# Patient Record
Sex: MALE | Race: WHITE | NOT HISPANIC OR LATINO | Employment: OTHER | URBAN - METROPOLITAN AREA
[De-identification: names, ages, dates, MRNs, and addresses within clinical notes are randomized per-mention and may not be internally consistent; named-entity substitution may affect disease eponyms.]

---

## 2019-05-15 ENCOUNTER — HOSPITAL ENCOUNTER (OUTPATIENT)
Facility: CLINIC | Age: 59
Setting detail: OBSERVATION
Discharge: HOME | End: 2019-05-16
Attending: EMERGENCY MEDICINE | Admitting: HOSPITALIST
Payer: COMMERCIAL

## 2019-05-15 ENCOUNTER — APPOINTMENT (OUTPATIENT)
Dept: CT IMAGING | Facility: CLINIC | Age: 59
End: 2019-05-15
Attending: EMERGENCY MEDICINE
Payer: COMMERCIAL

## 2019-05-15 DIAGNOSIS — N23 URETERAL COLIC: Primary | ICD-10-CM

## 2019-05-15 DIAGNOSIS — R11.2 INTRACTABLE VOMITING WITH NAUSEA, UNSPECIFIED VOMITING TYPE: ICD-10-CM

## 2019-05-15 DIAGNOSIS — N23 RENAL COLIC: ICD-10-CM

## 2019-05-15 LAB
ALBUMIN UR-MCNC: NEGATIVE MG/DL
ANION GAP SERPL CALCULATED.3IONS-SCNC: 10 MMOL/L (ref 3–14)
APPEARANCE UR: CLEAR
BASOPHILS # BLD AUTO: 0 10E9/L (ref 0–0.2)
BASOPHILS NFR BLD AUTO: 0.4 %
BILIRUB UR QL STRIP: NEGATIVE
BUN SERPL-MCNC: 15 MG/DL (ref 7–30)
CALCIUM SERPL-MCNC: 9.4 MG/DL (ref 8.5–10.1)
CHLORIDE SERPL-SCNC: 106 MMOL/L (ref 94–109)
CO2 SERPL-SCNC: 21 MMOL/L (ref 20–32)
COLOR UR AUTO: ABNORMAL
CREAT SERPL-MCNC: 1.16 MG/DL (ref 0.66–1.25)
DIFFERENTIAL METHOD BLD: NORMAL
EOSINOPHIL # BLD AUTO: 0.1 10E9/L (ref 0–0.7)
EOSINOPHIL NFR BLD AUTO: 1.1 %
ERYTHROCYTE [DISTWIDTH] IN BLOOD BY AUTOMATED COUNT: 11.5 % (ref 10–15)
GFR SERPL CREATININE-BSD FRML MDRD: 69 ML/MIN/{1.73_M2}
GLUCOSE SERPL-MCNC: 121 MG/DL (ref 70–99)
GLUCOSE UR STRIP-MCNC: NEGATIVE MG/DL
HCT VFR BLD AUTO: 47.4 % (ref 40–53)
HGB BLD-MCNC: 16.5 G/DL (ref 13.3–17.7)
HGB UR QL STRIP: ABNORMAL
IMM GRANULOCYTES # BLD: 0 10E9/L (ref 0–0.4)
IMM GRANULOCYTES NFR BLD: 0.4 %
KETONES UR STRIP-MCNC: ABNORMAL MG/DL
LEUKOCYTE ESTERASE UR QL STRIP: NEGATIVE
LYMPHOCYTES # BLD AUTO: 1.7 10E9/L (ref 0.8–5.3)
LYMPHOCYTES NFR BLD AUTO: 20.6 %
MCH RBC QN AUTO: 31.2 PG (ref 26.5–33)
MCHC RBC AUTO-ENTMCNC: 34.8 G/DL (ref 31.5–36.5)
MCV RBC AUTO: 90 FL (ref 78–100)
MONOCYTES # BLD AUTO: 0.8 10E9/L (ref 0–1.3)
MONOCYTES NFR BLD AUTO: 9.7 %
MUCOUS THREADS #/AREA URNS LPF: PRESENT /LPF
NEUTROPHILS # BLD AUTO: 5.8 10E9/L (ref 1.6–8.3)
NEUTROPHILS NFR BLD AUTO: 67.8 %
NITRATE UR QL: NEGATIVE
NRBC # BLD AUTO: 0 10*3/UL
NRBC BLD AUTO-RTO: 0 /100
PH UR STRIP: 6.5 PH (ref 5–7)
PLATELET # BLD AUTO: 205 10E9/L (ref 150–450)
POTASSIUM SERPL-SCNC: 3.9 MMOL/L (ref 3.4–5.3)
RBC # BLD AUTO: 5.29 10E12/L (ref 4.4–5.9)
RBC #/AREA URNS AUTO: 1 /HPF (ref 0–2)
SODIUM SERPL-SCNC: 137 MMOL/L (ref 133–144)
SOURCE: ABNORMAL
SP GR UR STRIP: 1.02 (ref 1–1.03)
SQUAMOUS #/AREA URNS AUTO: <1 /HPF (ref 0–1)
UROBILINOGEN UR STRIP-MCNC: NORMAL MG/DL (ref 0–2)
WBC # BLD AUTO: 8.5 10E9/L (ref 4–11)
WBC #/AREA URNS AUTO: 1 /HPF (ref 0–5)

## 2019-05-15 PROCEDURE — 96361 HYDRATE IV INFUSION ADD-ON: CPT

## 2019-05-15 PROCEDURE — 99219 ZZC INITIAL OBSERVATION CARE,LEVL II: CPT | Performed by: INTERNAL MEDICINE

## 2019-05-15 PROCEDURE — 96374 THER/PROPH/DIAG INJ IV PUSH: CPT

## 2019-05-15 PROCEDURE — 85025 COMPLETE CBC W/AUTO DIFF WBC: CPT | Performed by: EMERGENCY MEDICINE

## 2019-05-15 PROCEDURE — 25000132 ZZH RX MED GY IP 250 OP 250 PS 637: Performed by: PHYSICIAN ASSISTANT

## 2019-05-15 PROCEDURE — 99285 EMERGENCY DEPT VISIT HI MDM: CPT | Mod: 25

## 2019-05-15 PROCEDURE — 25000128 H RX IP 250 OP 636: Performed by: EMERGENCY MEDICINE

## 2019-05-15 PROCEDURE — G0378 HOSPITAL OBSERVATION PER HR: HCPCS

## 2019-05-15 PROCEDURE — 81001 URINALYSIS AUTO W/SCOPE: CPT | Performed by: INTERNAL MEDICINE

## 2019-05-15 PROCEDURE — 96376 TX/PRO/DX INJ SAME DRUG ADON: CPT

## 2019-05-15 PROCEDURE — 80048 BASIC METABOLIC PNL TOTAL CA: CPT | Performed by: EMERGENCY MEDICINE

## 2019-05-15 PROCEDURE — 25000128 H RX IP 250 OP 636: Performed by: PHYSICIAN ASSISTANT

## 2019-05-15 PROCEDURE — 99244 OFF/OP CNSLTJ NEW/EST MOD 40: CPT | Mod: 57 | Performed by: UROLOGY

## 2019-05-15 PROCEDURE — 74176 CT ABD & PELVIS W/O CONTRAST: CPT

## 2019-05-15 PROCEDURE — 25800030 ZZH RX IP 258 OP 636: Performed by: PHYSICIAN ASSISTANT

## 2019-05-15 PROCEDURE — 25800030 ZZH RX IP 258 OP 636: Performed by: EMERGENCY MEDICINE

## 2019-05-15 PROCEDURE — 96375 TX/PRO/DX INJ NEW DRUG ADDON: CPT

## 2019-05-15 RX ORDER — HYDROMORPHONE HYDROCHLORIDE 1 MG/ML
.3-.5 INJECTION, SOLUTION INTRAMUSCULAR; INTRAVENOUS; SUBCUTANEOUS
Status: DISCONTINUED | OUTPATIENT
Start: 2019-05-15 | End: 2019-05-16 | Stop reason: HOSPADM

## 2019-05-15 RX ORDER — MORPHINE SULFATE 4 MG/ML
4 INJECTION, SOLUTION INTRAMUSCULAR; INTRAVENOUS
Status: DISCONTINUED | OUTPATIENT
Start: 2019-05-15 | End: 2019-05-15

## 2019-05-15 RX ORDER — LORAZEPAM 2 MG/ML
1 INJECTION INTRAMUSCULAR ONCE
Status: COMPLETED | OUTPATIENT
Start: 2019-05-15 | End: 2019-05-15

## 2019-05-15 RX ORDER — ONDANSETRON 2 MG/ML
4 INJECTION INTRAMUSCULAR; INTRAVENOUS EVERY 6 HOURS PRN
Status: DISCONTINUED | OUTPATIENT
Start: 2019-05-15 | End: 2019-05-16 | Stop reason: HOSPADM

## 2019-05-15 RX ORDER — ACETAMINOPHEN 325 MG/1
650 TABLET ORAL EVERY 4 HOURS PRN
Status: DISCONTINUED | OUTPATIENT
Start: 2019-05-15 | End: 2019-05-16 | Stop reason: HOSPADM

## 2019-05-15 RX ORDER — OXYCODONE HYDROCHLORIDE 5 MG/1
5-10 TABLET ORAL
Status: DISCONTINUED | OUTPATIENT
Start: 2019-05-15 | End: 2019-05-16 | Stop reason: HOSPADM

## 2019-05-15 RX ORDER — PROCHLORPERAZINE 25 MG
25 SUPPOSITORY, RECTAL RECTAL EVERY 12 HOURS PRN
Status: DISCONTINUED | OUTPATIENT
Start: 2019-05-15 | End: 2019-05-16 | Stop reason: HOSPADM

## 2019-05-15 RX ORDER — CITALOPRAM HYDROBROMIDE 20 MG/1
20 TABLET ORAL DAILY
Status: DISCONTINUED | OUTPATIENT
Start: 2019-05-15 | End: 2019-05-16 | Stop reason: HOSPADM

## 2019-05-15 RX ORDER — TAMSULOSIN HYDROCHLORIDE 0.4 MG/1
0.4 CAPSULE ORAL DAILY
Status: DISCONTINUED | OUTPATIENT
Start: 2019-05-15 | End: 2019-05-16 | Stop reason: HOSPADM

## 2019-05-15 RX ORDER — NALOXONE HYDROCHLORIDE 0.4 MG/ML
.1-.4 INJECTION, SOLUTION INTRAMUSCULAR; INTRAVENOUS; SUBCUTANEOUS
Status: DISCONTINUED | OUTPATIENT
Start: 2019-05-15 | End: 2019-05-16 | Stop reason: HOSPADM

## 2019-05-15 RX ORDER — ACETAMINOPHEN 325 MG/1
325-650 TABLET ORAL EVERY 6 HOURS PRN
COMMUNITY
End: 2021-08-26

## 2019-05-15 RX ORDER — CITALOPRAM HYDROBROMIDE 20 MG/1
20 TABLET ORAL DAILY
COMMUNITY
Start: 2019-02-02 | End: 2019-08-27

## 2019-05-15 RX ORDER — LORAZEPAM 2 MG/ML
0.5 INJECTION INTRAMUSCULAR EVERY 6 HOURS PRN
Status: DISCONTINUED | OUTPATIENT
Start: 2019-05-15 | End: 2019-05-16 | Stop reason: HOSPADM

## 2019-05-15 RX ORDER — SODIUM CHLORIDE, SODIUM LACTATE, POTASSIUM CHLORIDE, CALCIUM CHLORIDE 600; 310; 30; 20 MG/100ML; MG/100ML; MG/100ML; MG/100ML
INJECTION, SOLUTION INTRAVENOUS CONTINUOUS
Status: ACTIVE | OUTPATIENT
Start: 2019-05-15 | End: 2019-05-16

## 2019-05-15 RX ORDER — METOCLOPRAMIDE HYDROCHLORIDE 5 MG/ML
10 INJECTION INTRAMUSCULAR; INTRAVENOUS ONCE
Status: COMPLETED | OUTPATIENT
Start: 2019-05-15 | End: 2019-05-15

## 2019-05-15 RX ORDER — ONDANSETRON 4 MG/1
4 TABLET, ORALLY DISINTEGRATING ORAL EVERY 6 HOURS PRN
Status: DISCONTINUED | OUTPATIENT
Start: 2019-05-15 | End: 2019-05-16 | Stop reason: HOSPADM

## 2019-05-15 RX ORDER — HYDROMORPHONE HYDROCHLORIDE 1 MG/ML
.3-.5 INJECTION, SOLUTION INTRAMUSCULAR; INTRAVENOUS; SUBCUTANEOUS
Status: DISCONTINUED | OUTPATIENT
Start: 2019-05-15 | End: 2019-05-15

## 2019-05-15 RX ORDER — POLYETHYLENE GLYCOL 3350 17 G/17G
17 POWDER, FOR SOLUTION ORAL DAILY PRN
Status: DISCONTINUED | OUTPATIENT
Start: 2019-05-15 | End: 2019-05-16 | Stop reason: HOSPADM

## 2019-05-15 RX ORDER — LORAZEPAM 2 MG/ML
.5-1 INJECTION INTRAMUSCULAR EVERY 4 HOURS PRN
Status: DISCONTINUED | OUTPATIENT
Start: 2019-05-15 | End: 2019-05-15

## 2019-05-15 RX ORDER — AMOXICILLIN 250 MG
2 CAPSULE ORAL 2 TIMES DAILY PRN
Status: DISCONTINUED | OUTPATIENT
Start: 2019-05-15 | End: 2019-05-16 | Stop reason: HOSPADM

## 2019-05-15 RX ORDER — CLONAZEPAM 1 MG/1
1 TABLET ORAL 2 TIMES DAILY PRN
COMMUNITY
Start: 2017-04-14 | End: 2019-05-15

## 2019-05-15 RX ORDER — ACETAMINOPHEN 650 MG/1
650 SUPPOSITORY RECTAL EVERY 4 HOURS PRN
Status: DISCONTINUED | OUTPATIENT
Start: 2019-05-15 | End: 2019-05-16 | Stop reason: HOSPADM

## 2019-05-15 RX ORDER — SODIUM CHLORIDE 9 MG/ML
1000 INJECTION, SOLUTION INTRAVENOUS CONTINUOUS
Status: DISCONTINUED | OUTPATIENT
Start: 2019-05-15 | End: 2019-05-15

## 2019-05-15 RX ORDER — AMOXICILLIN 250 MG
1 CAPSULE ORAL 2 TIMES DAILY PRN
Status: DISCONTINUED | OUTPATIENT
Start: 2019-05-15 | End: 2019-05-16 | Stop reason: HOSPADM

## 2019-05-15 RX ORDER — PROCHLORPERAZINE MALEATE 5 MG
10 TABLET ORAL EVERY 6 HOURS PRN
Status: DISCONTINUED | OUTPATIENT
Start: 2019-05-15 | End: 2019-05-16 | Stop reason: HOSPADM

## 2019-05-15 RX ORDER — ONDANSETRON 2 MG/ML
4 INJECTION INTRAMUSCULAR; INTRAVENOUS EVERY 30 MIN PRN
Status: DISCONTINUED | OUTPATIENT
Start: 2019-05-15 | End: 2019-05-15

## 2019-05-15 RX ADMIN — SODIUM CHLORIDE, POTASSIUM CHLORIDE, SODIUM LACTATE AND CALCIUM CHLORIDE: 600; 310; 30; 20 INJECTION, SOLUTION INTRAVENOUS at 18:30

## 2019-05-15 RX ADMIN — HYDROMORPHONE HYDROCHLORIDE 1 MG: 1 INJECTION, SOLUTION INTRAMUSCULAR; INTRAVENOUS; SUBCUTANEOUS at 06:28

## 2019-05-15 RX ADMIN — Medication 0.5 MG: at 14:00

## 2019-05-15 RX ADMIN — SODIUM CHLORIDE, POTASSIUM CHLORIDE, SODIUM LACTATE AND CALCIUM CHLORIDE: 600; 310; 30; 20 INJECTION, SOLUTION INTRAVENOUS at 11:44

## 2019-05-15 RX ADMIN — LORAZEPAM 1 MG: 2 INJECTION INTRAMUSCULAR; INTRAVENOUS at 09:31

## 2019-05-15 RX ADMIN — TAMSULOSIN HYDROCHLORIDE 0.4 MG: 0.4 CAPSULE ORAL at 12:57

## 2019-05-15 RX ADMIN — Medication 0.5 MG: at 22:20

## 2019-05-15 RX ADMIN — ONDANSETRON 4 MG: 2 INJECTION INTRAMUSCULAR; INTRAVENOUS at 06:29

## 2019-05-15 RX ADMIN — SODIUM CHLORIDE 1000 ML: 9 INJECTION, SOLUTION INTRAVENOUS at 08:48

## 2019-05-15 RX ADMIN — CITALOPRAM HYDROBROMIDE 20 MG: 20 TABLET ORAL at 12:57

## 2019-05-15 RX ADMIN — SODIUM CHLORIDE 1000 ML: 9 INJECTION, SOLUTION INTRAVENOUS at 06:28

## 2019-05-15 RX ADMIN — Medication 0.5 MG: at 17:08

## 2019-05-15 RX ADMIN — HYDROMORPHONE HYDROCHLORIDE 1 MG: 1 INJECTION, SOLUTION INTRAMUSCULAR; INTRAVENOUS; SUBCUTANEOUS at 06:59

## 2019-05-15 RX ADMIN — METOCLOPRAMIDE 10 MG: 5 INJECTION, SOLUTION INTRAMUSCULAR; INTRAVENOUS at 08:48

## 2019-05-15 RX ADMIN — LORAZEPAM 0.5 MG: 2 INJECTION INTRAMUSCULAR; INTRAVENOUS at 18:10

## 2019-05-15 RX ADMIN — ONDANSETRON 4 MG: 2 INJECTION INTRAMUSCULAR; INTRAVENOUS at 08:08

## 2019-05-15 ASSESSMENT — ENCOUNTER SYMPTOMS
CHILLS: 1
NAUSEA: 1
DIAPHORESIS: 1
SHORTNESS OF BREATH: 1
FLANK PAIN: 1
FEVER: 0
VOMITING: 0

## 2019-05-15 NOTE — PROGRESS NOTES
UROLOGY    OK for regular diet. NPO after midnight for procedure tomorrow.     Lynda Sharma PA-C  [facility]rology  [phone]

## 2019-05-15 NOTE — H&P
[facility]  H & P      Patient Name: Krystian Akins MRN# [id number]   Age: 59 year old YOB: 1960     Date of Admission:5/15/2019    Primary care provider: Tacos Araujo  Date of Service: 5/15/2019         Assessment and Plan:   Krystian Akins is a 59 year old male with a history of Gastric Ulcer, GI Bleed, HLD, Migraine HA, BARBARA, Nephrolithiasis who presents to the ED today 2/2 left flank pain.    Acute Renal Colic - left sided flank pain with CT abd/pelvis revealing a 0.6 cm proximal left ureteral stone causing mild hydronephrosis.  Afebrile with normal wbc.  Pt with ongoing pain, nausea and emesis.  He had a past kidney stone in 2009 which was 3mm and was able to be passed spontaneously.  - start Flomax  - IVF hydration, antiemetics and analgesics prn  - monitor I/O and check bladder scan now  - strain urine  - obtain UA  - Urology consult    Generalized Anxiety Disorder - continue pta Citalopram    Hx GI Bleed - EGD 10/2013 which revealed an actively bleeding 7mm ulcer which was clipped and cauterized.  Felt due to aspirin use at that time.  Followed by [facility].  No longer on PPI.  - avoid NSAIDs for pain control    Hx HLD - not currently on medications.  Follow up with PCP.      CODE: Full  Diet/IVF: npo, NS  GI ppx:  pepcid  DVT ppx: ambulation    Daisha Fontana MS, PA-C  Physician Assistant   Hospitalist Service  Pager: [phone]           Chief Complaint:   Left Flank Pain         HPI:   59 year old male with a history of Gastric Ulcer, GI Bleed, HLD, Migraine HA, BARBARA, Nephrolithiasis who presents to the ED today 2/2 left flank pain.    Patient reports he woke up this morning around 330am with a sudden onset of left flank pain which was similar to when he had a kidney stone back in 2009.  He reports nausea and emesis since arriving to the ED.  He has not had any urine output today.  Denies dysuria or hematuria yesterday.  He denies any chest pain, shortness of breath,  cough, back injury, constipation, diarrhea, fevers.    ED work up revealed patient hemodynamically stable and afebrile on room air.  Laboratory work up revealed BMP, CBC wnl.  CT abd/pelvis revealed a 0.6 cm proximal left ureteral stone causing mild hydronephrosis and Fatty infiltration of the liver.  Patient received IVF, Ativan, Reglan, Dilaudid, Zofran and admitted for further management.         Past Medical History:     Past Medical History:   Diagnosis Date     Gastric ulcer      Generalized anxiety disorder      GI bleed      HLD (hyperlipidemia)      Migraine      Nephrolithiasis           Past Surgical History:     Past Surgical History:   Procedure Laterality Date     AS ESOPHAGOSCOPY, DIAGNOSTIC            Social History:     Social History     Socioeconomic History     Marital status:      Spouse name: Not on file     Number of children: Not on file     Years of education: Not on file     Highest education level: Not on file   Occupational History     Not on file   Social Needs     Financial resource strain: Not on file     Food insecurity:     Worry: Not on file     Inability: Not on file     Transportation needs:     Medical: Not on file     Non-medical: Not on file   Tobacco Use     Smoking status: Never Smoker   Substance and Sexual Activity     Alcohol use: Not on file     Drug use: Not on file     Sexual activity: Not on file   Lifestyle     Physical activity:     Days per week: Not on file     Minutes per session: Not on file     Stress: Not on file   Relationships     Social connections:     Talks on phone: Not on file     Gets together: Not on file     Attends [employer] service: Not on file     Active member of club or organization: Not on file     Attends meetings of clubs or organizations: Not on file     Relationship status: Not on file     Intimate partner violence:     Fear of current or ex partner: Not on file     Emotionally abused: Not on file     Physically abused: Not on file      Forced sexual activity: Not on file   Other Topics Concern     Not on file   Social History Narrative     Not on file          Family History:     Family History   Problem Relation Age of Onset     Breast Cancer Mother      Cerebrovascular Disease Father           Allergies:    No Known Allergies       Medications:     Prior to Admission medications    Medication Sig Last Dose Taking? Auth Provider   acetaminophen (TYLENOL) 325 MG tablet Take 325-650 mg by mouth every 6 hours as needed for mild pain Past Month at Unknown time Yes Unknown, Entered By History   acetaminophen-caffeine (EXCEDRIN TENSION HEADACHE) 500-65 MG TABS Take 1 tablet by mouth every 6 hours as needed for mild pain Past Month at Unknown time Yes Unknown, Entered By History   citalopram (CELEXA) 20 MG tablet Take 20 mg by mouth daily 5/14/2019 at am Yes Unknown, Entered By History          Review of Systems:   A complete ROS was performed and is negative other than what is stated in the HPI.       Physical Exam:   Blood pressure 102/66, pulse 73, temperature 98  F (36.7  C), temperature source Oral, resp. rate 18, weight 83 kg (182 lb 15.7 oz), SpO2 96 %.  General: Alert, interactive, sitting up in bed, lethargic but alert, vomiting.  HEENT: AT/NC  Chest/Resp: clear to auscultation bilaterally, no crackles or wheezes  Heart/CV: regular rate and rhythm, no murmur  Abdomen/GI: Soft, mild left flank tenderness, mild abdominal distension. Decreased BS.  No rebound or guarding.  Extremities/MSK: No LE edema  Skin: Warm and dry  Neuro: Alert & oriented x 3, no focal deficits, moves all extremities equally         Labs:   ROUTINE ICU LABS (Last four results)  CMP  Recent Labs   Lab 05/15/19  0621      POTASSIUM 3.9   CHLORIDE 106   CO2 21   ANIONGAP 10   *   BUN 15   CR 1.16   GFRESTIMATED 69   GFRESTBLACK 80   TOD 9.4     CBC  Recent Labs   Lab 05/15/19  0621   WBC 8.5   RBC 5.29   HGB 16.5   HCT 47.4   MCV 90   MCH 31.2   MCHC 34.8   RDW  11.5        INRNo lab results found in last 7 days.  Arterial Blood GasNo lab results found in last 7 days.       Imaging/Procedures:     Results for orders placed or performed during the hospital encounter of 05/15/19   CT Abdomen Pelvis w/o Contrast    Narrative    CT ABDOMEN PELVIS W/O CONTRAST  5/15/2019 6:42 AM     HISTORY: Left flank pain. History of urinary stones.    TECHNIQUE: Noncontrast CT abdomen and pelvis was performed. Radiation  dose for this scan was reduced using automated exposure control,  adjustment of the mA and/or kV according to patient size, or iterative  reconstruction technique.    COMPARISON: 7/20/2009.    FINDINGS:  Abdomen: There is mild dilatation of the left renal collecting system  proximal ureter caused by a 0.6 cm proximal ureteral stone. There is  no other renal or ureteral stone on either side. The kidneys otherwise  appear normal.    The lung bases are unremarkable. The heart size is normal. Evaluation  of the solid abdominal organs is limited by the lack of intravenous  contrast. There is diffuse fatty infiltration of the liver. The  spleen, gallbladder, pancreas and adrenal glands are normal in  appearance. There is no abdominal or pelvic lymph node enlargement.    Pelvis: Bowel appears normal without obstruction. The appendix is  normal. No free intraperitoneal gas or fluid.      Impression    IMPRESSION:  1. There is a 0.6 cm proximal left ureteral stone causing mild  hydronephrosis.  2. Fatty infiltration of the liver.

## 2019-05-15 NOTE — PHARMACY-ADMISSION MEDICATION HISTORY
Admission medication history interview status for this patient is complete. See [facility] admission navigator for allergy information, prior to admission medications and immunization status.     Medication history interview source(s):Patient  Medication history resources (including written lists, pill bottles, clinic record):None  Primary pharmacy:[facility] [id number] [address] [facility] [address]    Changes made to PTA medication list:  Added: all medications  Deleted: none  Changed: none    Prior to Admission medications    Medication Sig Last Dose Taking? Auth Provider   acetaminophen (TYLENOL) 325 MG tablet Take 325-650 mg by mouth every 6 hours as needed for mild pain Past Month at Unknown time Yes Unknown, Entered By History   acetaminophen-caffeine (EXCEDRIN TENSION HEADACHE) 500-65 MG TABS Take 1 tablet by mouth every 6 hours as needed for mild pain Past Month at Unknown time Yes Unknown, Entered By History   citalopram (CELEXA) 20 MG tablet Take 20 mg by mouth daily 5/14/2019 at am Yes Unknown, Entered By History

## 2019-05-15 NOTE — CONSULTS
[facility] Consultation by [facility]    Krystian Akins MRN# [id number]   Age: 59 year old YOB: 1960     Date of Admission:  5/15/2019    Reason for consult: Renal/ureteral calculus       Requesting PA/MD: PATRICK Rivera       Level of consult: Consult, follow and place orders           Assessment and Plan:   Assessment:   Left renal colic 2/2 6mm left proximal stone      Plan:   Medical expulsive therapy vs OR procedure tomorrow for cysto, left ureteroscopy, with holium laser and stent. May need 2 procedures to clear stone.   Discussed with IM and Dr. Kaye.   Continue NPO for now until on OR schedule.     Lynda Sharma PA-C  [facility]  [phone]                 Chief Complaint:   Ureteral stone     History is obtained from the patient and EMR.         History of Present Illness:     This patient is a 59 year old male with a history of nephrolithiasis who presents to the ED today 2/2 left flank pain. CT in ED revealed a 0.6 cm proximal left ureteral stone causing mild hydronephrosis.  Afebrile with normal wbc.  Pt with ongoing pain, nausea and emesis in the ER.      He had a past kidney stone in 2009 which was 3mm and was able to be passed spontaneously.  Seen by Dr. Mena at that time.     Feeling sleepy and more comfortable now. Discussions held mainly with pt's wife.               Past Medical History:   Gastric ulcer with GI bleed  Migraines  Kidney stones          Past Surgical History:     No past surgical history on file.          Social History:     Social History     Tobacco Use     Smoking status: Not on file   Substance Use Topics     Alcohol use: Not on file             Family History:     No family history on file.  Family history reviewed and updated in EPIC and reviewed            Allergies:     No Known Allergies          Medications:     Current Facility-Administered Medications   Medication     acetaminophen (TYLENOL) Suppository 650 mg     acetaminophen  (TYLENOL) tablet 650 mg     HYDROmorphone (PF) (DILAUDID) injection 0.3-0.5 mg     lactated ringers infusion     LORazepam (ATIVAN) injection 0.5-1 mg     melatonin tablet 1 mg     naloxone (NARCAN) injection 0.1-0.4 mg     ondansetron (ZOFRAN) injection 4 mg     ondansetron (ZOFRAN-ODT) ODT tab 4 mg    Or     ondansetron (ZOFRAN) injection 4 mg     oxyCODONE (ROXICODONE) tablet 5-10 mg     polyethylene glycol (MIRALAX/GLYCOLAX) Packet 17 g     prochlorperazine (COMPAZINE) injection 10 mg    Or     prochlorperazine (COMPAZINE) tablet 10 mg    Or     prochlorperazine (COMPAZINE) Suppository 25 mg     senna-docusate (SENOKOT-S/PERICOLACE) 8.6-50 MG per tablet 1 tablet    Or     senna-docusate (SENOKOT-S/PERICOLACE) 8.6-50 MG per tablet 2 tablet     tamsulosin (FLOMAX) capsule 0.4 mg             Review of Systems:   A comprehensive review of systems was performed and found to be negative except as described in the HPI.     /70   Pulse 73   Temp 95.6  F (35.3  C) (Oral)   Resp 20   Wt 83 kg (182 lb 15.7 oz)   SpO2 96%   GEN: NAD, lying in bed, sleepy but can be aroused.   EYES: EOMI  MOUTH: MMM  NECK: Supple  RESP: Unlabored breathing  CARDIAC: No LE edema  SKIN: Warm  ABD: soft  NEURO: AAO          Data:     Lab Results   Component Value Date    WBC 8.5 05/15/2019    HGB 16.5 05/15/2019    HCT 47.4 05/15/2019    MCV 90 05/15/2019     05/15/2019     Lab Results   Component Value Date    CR 1.16 05/15/2019     CT Abdomen/Pelvis w/o IV contrast-stone protocol:   1. There is a 0.6 cm proximal left ureteral stone causing mild hydronephrosis.  2. Fatty infiltration of the liver. As per radiology.

## 2019-05-15 NOTE — ED TRIAGE NOTES
Lt flank pain since 330am, woke pt from sleep.  Pt states pain similar to previous kidney stone. Associated nausea.

## 2019-05-15 NOTE — PROVIDER NOTIFICATION
bladder scanned: 450 ml, requesting straight cath order. Also requesting Npo except med order. Currently npo w/ ice only. Thanks

## 2019-05-15 NOTE — ED NOTES
[facility]  ED Nurse Handoff Report    Krystian Akins is a 59 year old male   ED Chief complaint: Flank Pain  . ED Diagnosis:   Final diagnoses:   Renal colic   Intractable vomiting with nausea, unspecified vomiting type     Allergies: No Known Allergies    Code Status: Full Code  Activity level - Baseline/Home:  Independent. Activity Level - Current:   Independent. Lift room needed: No. Bariatric: No   Needed: No   Isolation: No. Infection: Not Applicable.     Vital Signs:   Vitals:    05/15/19 0830 05/15/19 0845 05/15/19 0900 05/15/19 0915   BP: 122/81 123/81 115/78 133/78   Pulse: 65 64 63 63   Resp:       Temp:       TempSrc:       SpO2: 95% 97% 94% 97%   Weight:           Cardiac Rhythm:  ,      Pain level: 0-10 Pain Scale: 2  Patient confused: No. Patient Falls Risk: Yes.   Elimination Status: waiting to void   Patient Report - Initial Complaint: Flank Pain. Focused Assessment: Patient presents left flank pain & nausea with history of kidney stone's. Feels like previous kidney stones.   Tests Performed: labs, imaging. Abnormal Results:   Labs Ordered and Resulted from Time of ED Arrival Up to the Time of Departure from the ED   BASIC METABOLIC PANEL - Abnormal; Notable for the following components:       Result Value    Glucose 121 (*)     All other components within normal limits   CBC WITH PLATELETS DIFFERENTIAL   ROUTINE UA WITH MICROSCOPIC   PERIPHERAL IV CATHETER   PULSE OXIMETRY NURSING   STRAIN URINE     CT Abdomen Pelvis w/o Contrast   Preliminary Result   IMPRESSION:   1. There is a 0.6 cm proximal left ureteral stone causing mild   hydronephrosis.   2. Fatty infiltration of the liver.         Treatments provided: See MAR  Family Comments: Wife at bedside  OBS brochure/video discussed/provided to patient:  Yes  ED Medications:   Medications   0.9% sodium chloride BOLUS (0 mLs Intravenous Stopped 5/15/19 0820)     Followed by   sodium chloride 0.9% infusion (1,000 mLs  Intravenous New Bag 5/15/19 0848)   ondansetron (ZOFRAN) injection 4 mg (4 mg Intravenous Given 5/15/19 0808)   morphine (PF) injection 4 mg (has no administration in time range)   HYDROmorphone (DILAUDID) injection 1 mg (1 mg Intravenous Given 5/15/19 0659)   metoclopramide (REGLAN) injection 10 mg (10 mg Intravenous Given 5/15/19 0848)   LORazepam (ATIVAN) injection 1 mg (1 mg Intravenous Given 5/15/19 0931)     Drips infusing:  Yes  For the majority of the shift, the patient's behavior Green. Interventions performed were na.     Severe Sepsis OR Septic Shock Diagnosis Present: No      ED Nurse Name/Phone Number: Christina Jeter,   9:39 AM    RECEIVING UNIT ED HANDOFF REVIEW    Above ED Nurse Handoff Report was reviewed: yes  Reviewed by: Dario Muniz on May 15, 2019 at 10:53 AM

## 2019-05-15 NOTE — PLAN OF CARE
VSS. Prn Iv dilaudid 0.5 x1 for pain control. Oriented x4. Lethargic. Weaned of 02, 96% room air. Up w/ 1 assist, unsteady. Flow max initiated, Regular diet, NPO at midnight 5/16 for possible urology intervention. Straining urine, no stones. Unable to void, scanned for 453 mls. Attempted hot/cold packs. Straight cath for 625 mls. Denied nausea, small emesis per pt.

## 2019-05-15 NOTE — ED PROVIDER NOTES
History     Chief Complaint:  Flank Pain    The history is provided by the patient and the spouse.      Krystian Akins is a 59 year old male with a history of kidney stones, hyperlipidemia, and BARBARA, who presents to the emergency department with his wife for evaluation of left flank pain. At 0330 this morning, the patient woke up with the sudden onset of intense left sided flank pain. He states that the pain started in his back and has since moved more to the front. He was short of breath when the pain first started. He is also experiencing nausea and chills. To note, the patient has had a 3 mm kidney stone a few years prior, but was able to pass it on his own without surgery. However, the pain was located on his right side with that episode. At 0530 this morning he took two, 500 mg of Tylenol, which seemed to have helped. His intense pain and history of kidney stones prompted the patient to seek evaluation in the emergency room today.     Here, the patient's wife reports that he is breathing better. To note, the patient has not urinated since the pain started. Patient denies vomiting, a fever, or any other symptoms.    Allergies:  Hydrocodone-Acetaminophen    Medications:    Celexa  Klonopin  Citalopram    Past Medical History:    Hyperlipidemia    Migraines  BARBARA  Bleeding ulcers    Past Surgical History:    Subtalar arthroereisis  Esophagogastroduodenoscopy    Family History:    Heart disease: Father  Hyperlipidemia  Breast cancer    Social History:  Negative for tobacco use.  Positive for alcohol use.  Negative for drug use.  Marital Status:        Review of Systems   Constitutional: Positive for chills and diaphoresis. Negative for fever.   Respiratory: Positive for shortness of breath.    Gastrointestinal: Positive for nausea. Negative for vomiting.   Genitourinary: Positive for flank pain (Left side).   All other systems reviewed and are negative.      Physical Exam     Patient Vitals for the past 24  hrs:   BP Temp Temp src Pulse Heart Rate Resp SpO2 Weight   05/15/19 1045 102/66 -- -- 73 -- -- 96 % --   05/15/19 1030 120/77 -- -- 84 -- -- 96 % --   05/15/19 1015 109/60 -- -- 78 -- -- 96 % --   05/15/19 1000 120/71 -- -- 81 -- -- 95 % --   05/15/19 0945 121/71 -- -- 79 -- -- -- --   05/15/19 0930 129/74 -- -- 66 -- -- 95 % --   05/15/19 0915 133/78 -- -- 63 -- -- 97 % --   05/15/19 0900 115/78 -- -- 63 -- -- 94 % --   05/15/19 0845 123/81 -- -- 64 -- -- 97 % --   05/15/19 0830 122/81 -- -- 65 -- -- 95 % --   05/15/19 0815 125/78 -- -- 68 -- -- 91 % --   05/15/19 0800 124/72 -- -- 70 -- -- 95 % --   05/15/19 0745 122/82 -- -- 64 -- -- 96 % --   05/15/19 0715 128/78 -- -- 71 -- -- 95 % --   05/15/19 0700 143/86 -- -- 68 -- -- 96 % --   05/15/19 0610 (!) 144/117 98  F (36.7  C) Oral -- 71 18 99 % 83 kg (182 lb 15.7 oz)     Physical Exam   Constitutional: He appears well-developed and well-nourished. He appears distressed.   HENT:   Right Ear: External ear normal.   Left Ear: External ear normal.   Mouth/Throat: Oropharynx is clear and moist. No oropharyngeal exudate.   TM's clear bilaterally   Eyes: Pupils are equal, round, and reactive to light. Conjunctivae are normal. No scleral icterus.   Neck: Normal range of motion. Neck supple.   Cardiovascular: Normal rate, regular rhythm, normal heart sounds and intact distal pulses. Exam reveals no gallop and no friction rub.   No murmur heard.  Pulmonary/Chest: Effort normal and breath sounds normal. No respiratory distress. He has no wheezes. He has no rales.   Abdominal: Soft. Bowel sounds are normal. He exhibits no distension and no mass. There is tenderness.   Left flank tenderness   Musculoskeletal: He exhibits no edema.   Neurological: He is alert.   Skin: Skin is warm. No rash noted.   diaphoretic   Psychiatric: He has a normal mood and affect.         Emergency Department Course   Imaging:  CT Abdomen/Pelvis w/o IV contrast-stone protocol:   1. There is a 0.6 cm  proximal left ureteral stone causing mild hydronephrosis.  2. Fatty infiltration of the liver. As per radiology.     Laboratory:  CBC: WBC: 8.5, HGB: 16.5, PLT: 205  BMP: Glucose 121 (H), o/w WNL (Creatinine: 1.16)    UA with Microscopic: Pending    Interventions:  0628 NS 1L IV  0628 Dilaudid 1 mg IV  0629 Zofran 4 mg IV  0659 Dilaudid 1 mg IV  0808 Zofran 4 mg IV  0848 NS 1L IV  0848 Reglan 10 mg IV  0931 Ativan 1 mg IV     Emergency Department Course:  Nursing notes and vitals reviewed. 0625 I performed an exam of the patient as documented above.     IV inserted. Medicine administered as documented above. Blood drawn. This was sent to the lab for further testing, results above.    The patient provided a urine sample here in the emergency department. This was sent for laboratory testing, findings above.     The patient was sent for a abdomen/pelvis CT while in the emergency department, findings above.     0802 I rechecked the patient and discussed the results of his workup thus far.     0943  I consulted with Daisha Smith (Dr. Alcala) of the hospitalist services. They are in agreement to accept the patient for admission.    1002 I rechecked the patient and discussed the results of his workup thus far.     1045 I consulted with Dr. Kaye, urology, regarding the patient's history and presentation here in the emergency department.    Findings and plan explained to the Patient and spouse who consents to admission. Discussed the patient with Dr. Alcala, who will admit the patient to an observation bed for further monitoring, evaluation, and treatment.     Impression & Plan    Medical Decision Making:  Patient presents with lower right sided flank pain. The patient does have a history of kidney stones, and states that this episode is similar. He was diaphoretic and appeared uncomfortable upon my initial exam. Pain is mostly in the flank region and not located anteriorly. Evaluation does show a 6 mm stone on the left  side causing some hydronephrosis. His pain was controlled after two doses of Dilaudid. He did note that morphine does work for him due to his last episode. We had trouble controlling his nausea, despite trying Reglan and Zofran. He is more comfortable now after Ativan. He is still unable to take PO and therefore is going to be admitted for rehydration and pain/nausea control. Patient is admitted to Daisha Smith of the hospitalist service and urology was paged as well. Diagnosis: Renal colic and intractable nausea and vomiting.         Diagnosis:    ICD-10-CM    1. Renal colic N23    2. Intractable vomiting with nausea, unspecified vomiting type R11.2        Disposition:  Admitted to Dr. Fredrick Cao Disclosure:  Katlyn QUEZADA, am serving as a scribe on 5/15/2019 at 7:12 AM to personally document services performed by Ortiz Zavala MD based on my observations and the provider's statements to me.     Katlyn Ramires  5/15/2019   [facility] EMERGENCY DEPARTMENT       Ortiz Zavala MD  05/15/19 [id number]

## 2019-05-16 ENCOUNTER — APPOINTMENT (OUTPATIENT)
Dept: GENERAL RADIOLOGY | Facility: CLINIC | Age: 59
End: 2019-05-16
Attending: UROLOGY
Payer: COMMERCIAL

## 2019-05-16 ENCOUNTER — ANESTHESIA (OUTPATIENT)
Dept: SURGERY | Facility: CLINIC | Age: 59
End: 2019-05-16
Payer: COMMERCIAL

## 2019-05-16 ENCOUNTER — ANESTHESIA EVENT (OUTPATIENT)
Dept: SURGERY | Facility: CLINIC | Age: 59
End: 2019-05-16
Payer: COMMERCIAL

## 2019-05-16 VITALS
TEMPERATURE: 98.4 F | DIASTOLIC BLOOD PRESSURE: 86 MMHG | OXYGEN SATURATION: 98 % | HEART RATE: 77 BPM | SYSTOLIC BLOOD PRESSURE: 136 MMHG | WEIGHT: 182.98 LBS | RESPIRATION RATE: 14 BRPM

## 2019-05-16 DIAGNOSIS — N20.0 NEPHROLITHIASIS: Primary | ICD-10-CM

## 2019-05-16 LAB
ANION GAP SERPL CALCULATED.3IONS-SCNC: 5 MMOL/L (ref 3–14)
BASOPHILS # BLD AUTO: 0 10E9/L (ref 0–0.2)
BASOPHILS # BLD AUTO: 0 10E9/L (ref 0–0.2)
BASOPHILS NFR BLD AUTO: 0.1 %
BASOPHILS NFR BLD AUTO: 0.2 %
BUN SERPL-MCNC: 15 MG/DL (ref 7–30)
CALCIUM SERPL-MCNC: 8.7 MG/DL (ref 8.5–10.1)
CHLORIDE SERPL-SCNC: 107 MMOL/L (ref 94–109)
CO2 SERPL-SCNC: 30 MMOL/L (ref 20–32)
CREAT SERPL-MCNC: 1.55 MG/DL (ref 0.66–1.25)
DIFFERENTIAL METHOD BLD: ABNORMAL
DIFFERENTIAL METHOD BLD: ABNORMAL
EOSINOPHIL # BLD AUTO: 0 10E9/L (ref 0–0.7)
EOSINOPHIL # BLD AUTO: 0 10E9/L (ref 0–0.7)
EOSINOPHIL NFR BLD AUTO: 0 %
EOSINOPHIL NFR BLD AUTO: 0.3 %
ERYTHROCYTE [DISTWIDTH] IN BLOOD BY AUTOMATED COUNT: 11.8 % (ref 10–15)
ERYTHROCYTE [DISTWIDTH] IN BLOOD BY AUTOMATED COUNT: 11.9 % (ref 10–15)
GFR SERPL CREATININE-BSD FRML MDRD: 49 ML/MIN/{1.73_M2}
GLUCOSE SERPL-MCNC: 113 MG/DL (ref 70–99)
HCT VFR BLD AUTO: 44.4 % (ref 40–53)
HCT VFR BLD AUTO: 45.6 % (ref 40–53)
HGB BLD-MCNC: 15.1 G/DL (ref 13.3–17.7)
HGB BLD-MCNC: 15.3 G/DL (ref 13.3–17.7)
IMM GRANULOCYTES # BLD: 0 10E9/L (ref 0–0.4)
IMM GRANULOCYTES # BLD: 0 10E9/L (ref 0–0.4)
IMM GRANULOCYTES NFR BLD: 0.2 %
IMM GRANULOCYTES NFR BLD: 0.3 %
LYMPHOCYTES # BLD AUTO: 0.7 10E9/L (ref 0.8–5.3)
LYMPHOCYTES # BLD AUTO: 1.8 10E9/L (ref 0.8–5.3)
LYMPHOCYTES NFR BLD AUTO: 15.6 %
LYMPHOCYTES NFR BLD AUTO: 6 %
MCH RBC QN AUTO: 31.4 PG (ref 26.5–33)
MCH RBC QN AUTO: 31.7 PG (ref 26.5–33)
MCHC RBC AUTO-ENTMCNC: 33.6 G/DL (ref 31.5–36.5)
MCHC RBC AUTO-ENTMCNC: 34 G/DL (ref 31.5–36.5)
MCV RBC AUTO: 93 FL (ref 78–100)
MCV RBC AUTO: 94 FL (ref 78–100)
MONOCYTES # BLD AUTO: 0.4 10E9/L (ref 0–1.3)
MONOCYTES # BLD AUTO: 1 10E9/L (ref 0–1.3)
MONOCYTES NFR BLD AUTO: 3.2 %
MONOCYTES NFR BLD AUTO: 9 %
NEUTROPHILS # BLD AUTO: 11 10E9/L (ref 1.6–8.3)
NEUTROPHILS # BLD AUTO: 8.5 10E9/L (ref 1.6–8.3)
NEUTROPHILS NFR BLD AUTO: 74.6 %
NEUTROPHILS NFR BLD AUTO: 90.5 %
NRBC # BLD AUTO: 0 10*3/UL
NRBC # BLD AUTO: 0 10*3/UL
NRBC BLD AUTO-RTO: 0 /100
NRBC BLD AUTO-RTO: 0 /100
PLATELET # BLD AUTO: 163 10E9/L (ref 150–450)
PLATELET # BLD AUTO: 171 10E9/L (ref 150–450)
POTASSIUM SERPL-SCNC: 3.9 MMOL/L (ref 3.4–5.3)
RBC # BLD AUTO: 4.77 10E12/L (ref 4.4–5.9)
RBC # BLD AUTO: 4.87 10E12/L (ref 4.4–5.9)
SODIUM SERPL-SCNC: 142 MMOL/L (ref 133–144)
WBC # BLD AUTO: 11.4 10E9/L (ref 4–11)
WBC # BLD AUTO: 12.1 10E9/L (ref 4–11)

## 2019-05-16 PROCEDURE — 52351 CYSTOURETERO & OR PYELOSCOPE: CPT | Mod: 53 | Performed by: UROLOGY

## 2019-05-16 PROCEDURE — 80048 BASIC METABOLIC PNL TOTAL CA: CPT | Performed by: PHYSICIAN ASSISTANT

## 2019-05-16 PROCEDURE — 25500064 ZZH RX 255 OP 636: Performed by: UROLOGY

## 2019-05-16 PROCEDURE — 25800025 ZZH RX 258: Performed by: UROLOGY

## 2019-05-16 PROCEDURE — 36000054 ZZH SURGERY LEVEL 2 W FLUORO 1ST 30 MIN: Performed by: UROLOGY

## 2019-05-16 PROCEDURE — 25000125 ZZHC RX 250: Performed by: UROLOGY

## 2019-05-16 PROCEDURE — C1769 GUIDE WIRE: HCPCS | Performed by: UROLOGY

## 2019-05-16 PROCEDURE — 25000125 ZZHC RX 250: Performed by: ANESTHESIOLOGY

## 2019-05-16 PROCEDURE — 25800030 ZZH RX IP 258 OP 636: Performed by: ANESTHESIOLOGY

## 2019-05-16 PROCEDURE — 52332 CYSTOSCOPY AND TREATMENT: CPT | Mod: LT | Performed by: UROLOGY

## 2019-05-16 PROCEDURE — 93010 ELECTROCARDIOGRAM REPORT: CPT | Performed by: INTERNAL MEDICINE

## 2019-05-16 PROCEDURE — 25000128 H RX IP 250 OP 636: Performed by: NURSE ANESTHETIST, CERTIFIED REGISTERED

## 2019-05-16 PROCEDURE — C2617 STENT, NON-COR, TEM W/O DEL: HCPCS | Performed by: UROLOGY

## 2019-05-16 PROCEDURE — 25000128 H RX IP 250 OP 636: Performed by: PHYSICIAN ASSISTANT

## 2019-05-16 PROCEDURE — 36000058 ZZH SURGERY LEVEL 3 EA 15 ADDTL MIN: Performed by: UROLOGY

## 2019-05-16 PROCEDURE — G0378 HOSPITAL OBSERVATION PER HR: HCPCS

## 2019-05-16 PROCEDURE — 25000132 ZZH RX MED GY IP 250 OP 250 PS 637: Performed by: PHYSICIAN ASSISTANT

## 2019-05-16 PROCEDURE — 74420 UROGRAPHY RTRGR +-KUB: CPT | Mod: 26 | Performed by: UROLOGY

## 2019-05-16 PROCEDURE — 36415 COLL VENOUS BLD VENIPUNCTURE: CPT | Performed by: PHYSICIAN ASSISTANT

## 2019-05-16 PROCEDURE — 40000277 XR SURGERY CARM FLUORO LESS THAN 5 MIN W STILLS: Mod: TC

## 2019-05-16 PROCEDURE — 37000009 ZZH ANESTHESIA TECHNICAL FEE, EACH ADDTL 15 MIN: Performed by: UROLOGY

## 2019-05-16 PROCEDURE — 85025 COMPLETE CBC W/AUTO DIFF WBC: CPT | Performed by: PHYSICIAN ASSISTANT

## 2019-05-16 PROCEDURE — 27210794 ZZH OR GENERAL SUPPLY STERILE: Performed by: UROLOGY

## 2019-05-16 PROCEDURE — 25000128 H RX IP 250 OP 636: Performed by: UROLOGY

## 2019-05-16 PROCEDURE — 36415 COLL VENOUS BLD VENIPUNCTURE: CPT | Performed by: INTERNAL MEDICINE

## 2019-05-16 PROCEDURE — 40000306 ZZH STATISTIC PRE PROC ASSESS II: Performed by: UROLOGY

## 2019-05-16 PROCEDURE — 71000027 ZZH RECOVERY PHASE 2 EACH 15 MINS: Performed by: UROLOGY

## 2019-05-16 PROCEDURE — 36000060 ZZH SURGERY LEVEL 3 W FLUORO 1ST 30 MIN: Performed by: UROLOGY

## 2019-05-16 PROCEDURE — 37000008 ZZH ANESTHESIA TECHNICAL FEE, 1ST 30 MIN: Performed by: UROLOGY

## 2019-05-16 PROCEDURE — 36000052 ZZH SURGERY LEVEL 2 EA 15 ADDTL MIN: Performed by: UROLOGY

## 2019-05-16 PROCEDURE — 71000014 ZZH RECOVERY PHASE 1 LEVEL 2 FIRST HR: Performed by: UROLOGY

## 2019-05-16 PROCEDURE — 25000125 ZZHC RX 250: Performed by: NURSE ANESTHETIST, CERTIFIED REGISTERED

## 2019-05-16 PROCEDURE — 99217 ZZC OBSERVATION CARE DISCHARGE: CPT | Performed by: INTERNAL MEDICINE

## 2019-05-16 PROCEDURE — C1894 INTRO/SHEATH, NON-LASER: HCPCS | Performed by: UROLOGY

## 2019-05-16 PROCEDURE — 85025 COMPLETE CBC W/AUTO DIFF WBC: CPT | Performed by: INTERNAL MEDICINE

## 2019-05-16 DEVICE — STENT URETERAL POLARIS ULTRA 6FRX26CM M0061921330
Type: IMPLANTABLE DEVICE | Site: URETER | Status: NON-FUNCTIONAL
Removed: 2019-05-30

## 2019-05-16 RX ORDER — CEFAZOLIN SODIUM 1 G
1 VIAL (EA) INJECTION SEE ADMIN INSTRUCTIONS
Status: CANCELLED | OUTPATIENT
Start: 2019-05-16

## 2019-05-16 RX ORDER — DEXAMETHASONE SODIUM PHOSPHATE 4 MG/ML
INJECTION, SOLUTION INTRA-ARTICULAR; INTRALESIONAL; INTRAMUSCULAR; INTRAVENOUS; SOFT TISSUE PRN
Status: DISCONTINUED | OUTPATIENT
Start: 2019-05-16 | End: 2019-05-16

## 2019-05-16 RX ORDER — FENTANYL CITRATE 50 UG/ML
INJECTION, SOLUTION INTRAMUSCULAR; INTRAVENOUS PRN
Status: DISCONTINUED | OUTPATIENT
Start: 2019-05-16 | End: 2019-05-16

## 2019-05-16 RX ORDER — CEFAZOLIN SODIUM 1 G/50ML
1 INJECTION, SOLUTION INTRAVENOUS SEE ADMIN INSTRUCTIONS
Status: DISCONTINUED | OUTPATIENT
Start: 2019-05-16 | End: 2019-05-16 | Stop reason: HOSPADM

## 2019-05-16 RX ORDER — SODIUM CHLORIDE, SODIUM LACTATE, POTASSIUM CHLORIDE, CALCIUM CHLORIDE 600; 310; 30; 20 MG/100ML; MG/100ML; MG/100ML; MG/100ML
INJECTION, SOLUTION INTRAVENOUS CONTINUOUS
Status: DISCONTINUED | OUTPATIENT
Start: 2019-05-16 | End: 2019-05-16 | Stop reason: HOSPADM

## 2019-05-16 RX ORDER — CEFAZOLIN SODIUM 2 G/100ML
2 INJECTION, SOLUTION INTRAVENOUS
Status: COMPLETED | OUTPATIENT
Start: 2019-05-16 | End: 2019-05-16

## 2019-05-16 RX ORDER — GLYCOPYRROLATE 0.2 MG/ML
INJECTION, SOLUTION INTRAMUSCULAR; INTRAVENOUS PRN
Status: DISCONTINUED | OUTPATIENT
Start: 2019-05-16 | End: 2019-05-16

## 2019-05-16 RX ORDER — PROPOFOL 10 MG/ML
INJECTION, EMULSION INTRAVENOUS PRN
Status: DISCONTINUED | OUTPATIENT
Start: 2019-05-16 | End: 2019-05-16

## 2019-05-16 RX ORDER — AMOXICILLIN 250 MG
1-2 CAPSULE ORAL 2 TIMES DAILY
Qty: 30 TABLET | Refills: 0 | Status: SHIPPED | OUTPATIENT
Start: 2019-05-16 | End: 2019-08-27

## 2019-05-16 RX ORDER — NALOXONE HYDROCHLORIDE 0.4 MG/ML
.1-.4 INJECTION, SOLUTION INTRAMUSCULAR; INTRAVENOUS; SUBCUTANEOUS
Status: DISCONTINUED | OUTPATIENT
Start: 2019-05-16 | End: 2019-05-16 | Stop reason: HOSPADM

## 2019-05-16 RX ORDER — ONDANSETRON 4 MG/1
4 TABLET, ORALLY DISINTEGRATING ORAL EVERY 30 MIN PRN
Status: DISCONTINUED | OUTPATIENT
Start: 2019-05-16 | End: 2019-05-16 | Stop reason: HOSPADM

## 2019-05-16 RX ORDER — HYDROMORPHONE HYDROCHLORIDE 1 MG/ML
.3-.5 INJECTION, SOLUTION INTRAMUSCULAR; INTRAVENOUS; SUBCUTANEOUS EVERY 5 MIN PRN
Status: DISCONTINUED | OUTPATIENT
Start: 2019-05-16 | End: 2019-05-16 | Stop reason: HOSPADM

## 2019-05-16 RX ORDER — LIDOCAINE 40 MG/G
CREAM TOPICAL
Status: DISCONTINUED | OUTPATIENT
Start: 2019-05-16 | End: 2019-05-16 | Stop reason: HOSPADM

## 2019-05-16 RX ORDER — ONDANSETRON 2 MG/ML
4 INJECTION INTRAMUSCULAR; INTRAVENOUS EVERY 30 MIN PRN
Status: DISCONTINUED | OUTPATIENT
Start: 2019-05-16 | End: 2019-05-16 | Stop reason: HOSPADM

## 2019-05-16 RX ORDER — ALBUTEROL SULFATE 0.83 MG/ML
2.5 SOLUTION RESPIRATORY (INHALATION) EVERY 4 HOURS PRN
Status: DISCONTINUED | OUTPATIENT
Start: 2019-05-16 | End: 2019-05-16 | Stop reason: HOSPADM

## 2019-05-16 RX ORDER — HYDRALAZINE HYDROCHLORIDE 20 MG/ML
2.5-5 INJECTION INTRAMUSCULAR; INTRAVENOUS EVERY 10 MIN PRN
Status: DISCONTINUED | OUTPATIENT
Start: 2019-05-16 | End: 2019-05-16 | Stop reason: HOSPADM

## 2019-05-16 RX ORDER — TAMSULOSIN HYDROCHLORIDE 0.4 MG/1
0.4 CAPSULE ORAL DAILY
Qty: 30 CAPSULE | Refills: 0 | Status: SHIPPED | OUTPATIENT
Start: 2019-05-16 | End: 2019-08-27

## 2019-05-16 RX ORDER — FENTANYL CITRATE 50 UG/ML
25-50 INJECTION, SOLUTION INTRAMUSCULAR; INTRAVENOUS
Status: DISCONTINUED | OUTPATIENT
Start: 2019-05-16 | End: 2019-05-16 | Stop reason: HOSPADM

## 2019-05-16 RX ORDER — OXYCODONE HYDROCHLORIDE 5 MG/1
5-10 TABLET ORAL EVERY 4 HOURS PRN
Qty: 10 TABLET | Refills: 0 | Status: SHIPPED | OUTPATIENT
Start: 2019-05-16 | End: 2019-08-27

## 2019-05-16 RX ADMIN — FENTANYL CITRATE 100 MCG: 50 INJECTION, SOLUTION INTRAMUSCULAR; INTRAVENOUS at 09:44

## 2019-05-16 RX ADMIN — ACETAMINOPHEN 650 MG: 325 TABLET, FILM COATED ORAL at 08:25

## 2019-05-16 RX ADMIN — LIDOCAINE HYDROCHLORIDE 50 MG: 10 INJECTION, SOLUTION INFILTRATION; PERINEURAL at 09:50

## 2019-05-16 RX ADMIN — PROPOFOL 200 MG: 10 INJECTION, EMULSION INTRAVENOUS at 09:50

## 2019-05-16 RX ADMIN — SODIUM CHLORIDE, POTASSIUM CHLORIDE, SODIUM LACTATE AND CALCIUM CHLORIDE: 600; 310; 30; 20 INJECTION, SOLUTION INTRAVENOUS at 11:08

## 2019-05-16 RX ADMIN — ONDANSETRON 4 MG: 2 INJECTION INTRAMUSCULAR; INTRAVENOUS at 10:04

## 2019-05-16 RX ADMIN — SODIUM CHLORIDE, POTASSIUM CHLORIDE, SODIUM LACTATE AND CALCIUM CHLORIDE: 600; 310; 30; 20 INJECTION, SOLUTION INTRAVENOUS at 09:08

## 2019-05-16 RX ADMIN — TAMSULOSIN HYDROCHLORIDE 0.4 MG: 0.4 CAPSULE ORAL at 08:15

## 2019-05-16 RX ADMIN — DEXAMETHASONE SODIUM PHOSPHATE 4 MG: 4 INJECTION, SOLUTION INTRA-ARTICULAR; INTRALESIONAL; INTRAMUSCULAR; INTRAVENOUS; SOFT TISSUE at 10:05

## 2019-05-16 RX ADMIN — GLYCOPYRROLATE 0.1 MG: 0.2 INJECTION, SOLUTION INTRAMUSCULAR; INTRAVENOUS at 10:12

## 2019-05-16 RX ADMIN — FENTANYL CITRATE 100 MCG: 50 INJECTION, SOLUTION INTRAMUSCULAR; INTRAVENOUS at 10:15

## 2019-05-16 RX ADMIN — CITALOPRAM HYDROBROMIDE 20 MG: 20 TABLET ORAL at 08:15

## 2019-05-16 RX ADMIN — MIDAZOLAM 2 MG: 1 INJECTION INTRAMUSCULAR; INTRAVENOUS at 09:38

## 2019-05-16 RX ADMIN — CEFAZOLIN SODIUM 2 G: 2 INJECTION, SOLUTION INTRAVENOUS at 09:42

## 2019-05-16 RX ADMIN — GLYCOPYRROLATE 0.1 MG: 0.2 INJECTION, SOLUTION INTRAMUSCULAR; INTRAVENOUS at 10:25

## 2019-05-16 NOTE — DISCHARGE INSTRUCTIONS
Maximum acetaminophen (Tylenol) dose from all sources should not exceed 4 grams (4000 mg) per day.  You had 650 mg today.              GENERAL ANESTHESIA OR SEDATION ADULT DISCHARGE INSTRUCTIONS   SPECIAL PRECAUTIONS FOR 24 HOURS AFTER SURGERY    IT IS NOT UNUSUAL TO FEEL LIGHT-HEADED OR FAINT, UP TO 24 HOURS AFTER SURGERY OR WHILE TAKING PAIN MEDICATION.  IF YOU HAVE THESE SYMPTOMS; SIT FOR A FEW MINUTES BEFORE STANDING AND HAVE SOMEONE ASSIST YOU WHEN YOU GET UP TO WALK OR USE THE BATHROOM.    YOU SHOULD REST AND RELAX FOR THE NEXT 24 HOURS AND YOU MUST MAKE ARRANGEMENTS TO HAVE SOMEONE STAY WITH YOU FOR AT LEAST 24 HOURS AFTER YOUR DISCHARGE.  AVOID HAZARDOUS AND STRENUOUS ACTIVITIES.  DO NOT MAKE IMPORTANT DECISIONS FOR 24 HOURS.    DO NOT DRIVE ANY VEHICLE OR OPERATE MECHANICAL EQUIPMENT FOR 24 HOURS FOLLOWING THE END OF YOUR SURGERY.  EVEN THOUGH YOU MAY FEEL NORMAL, YOUR REACTIONS MAY BE AFFECTED BY THE MEDICATION YOU HAVE RECEIVED.    DO NOT DRINK ALCOHOLIC BEVERAGES FOR 24 HOURS FOLLOWING YOUR SURGERY.    DRINK CLEAR LIQUIDS (APPLE JUICE, GINGER ALE, 7-UP, BROTH, ETC.).  PROGRESS TO YOUR REGULAR DIET AS YOU FEEL ABLE.    YOU MAY HAVE A DRY MOUTH, A SORE THROAT, MUSCLES ACHES OR TROUBLE SLEEPING.  THESE SHOULD GO AWAY AFTER 24 HOURS.    CALL YOUR DOCTOR FOR ANY OF THE FOLLOWING:  SIGNS OF INFECTION (FEVER, GROWING TENDERNESS AT THE SURGERY SITE, A LARGE AMOUNT OF DRAINAGE OR BLEEDING, SEVERE PAIN, FOUL-SMELLING DRAINAGE, REDNESS OR SWELLING.    IT HAS BEEN OVER 8 TO 10 HOURS SINCE SURGERY AND YOU ARE STILL NOT ABLE TO URINATE (PASS WATER).           CYSTOSCOPY DISCHARGE INSTRUCTIONS  [facility] / UROLOGY  [address]  [phone]    YOU MAY GO BACK TO YOUR NORMAL DIET AND ACTIVITY, UNLESS YOUR DOCTOR TELLS YOU NOT TO.    FOR THE NEXT TWO DAYS, YOU MAY NOTICE:    SOME BLOOD IN YOUR URINE.  SOME BURNING WHEN YOU URINATE (USE THE TOILET).  AN URGE TO URINATE MORE OFTEN.  BLADDER  SPASMS.    THESE ARE NORMAL AFTER THE PROCEDURE.  THEY SHOULD GO AWAY AFTER A DAY OR TWO.  TO RELIEVE THESE PROBLEMS:     DRINK 6 TO 8 LARGE GLASSES OF WATER EACH DAY (INCLUDES DRINKS AT MEALS).  THIS WILL HELP CLEAR THE URINE.    TAKE WARM BATHS TO RELIEVE PAIN AND BLADDER SPASMS.  DO NOT ADD ANYTHING TO THE BATH WATER.    YOUR DOCTOR MAY PRESCRIBE PAIN MEDICINE.  YOU MAY ALSO TAKE TYLENOL (ACETAMINOPHEN) FOR PAIN.    CALL YOUR SURGEON IF YOU HAVE:    A FEVER OVER 101 DEGREES.  CHECK YOUR TEMPERATURE UNDER YOUR TONGUE.    CHILLS.    FAILURE TO URINATE (NO URINE COMES OUT WHEN YOU TRY TO USE THE TOILET).  TRY SOAKING IN A BATHTUB FULL OF WARM WATER.  IF STILL NO URINE, CALL YOUR DOCTOR.    A LOT OF BLOOD IN THE URINE, OR BLOOD CLOTS LARGER THAN A NICKEL.      PAIN IN THE BACK OR BELLY AREA (ABDOMEN).    PAIN OR SPASMS THAT ARE NOT RELIEVED BY WARM TUB BATHS AND PAIN MEDICINE.      SEVERE PAIN, BURNING OR OTHER PROBLEMS WHILE PASSING URINE.    PAIN THAT GETS WORSE AFTER TWO DAYS.          STENT INFORMATION/DISCHARGE INSTRUCTIONS   o [facility] / UROLOGY  [address]  [phone]    During surgery, a stent may be placed in the ureter.  The ureter is the tube that drains urine from the kidney to the bladder.  The stent is placed to dilate (open) the ureter so stone fragments can pass easily through the ureter or to decrease ureteral swelling after surgery or to relieve an obstruction.      The stent is made of silicone.  The upper end of the stent curls in the kidney while the lower end rests in the bladder.    While the stent is in place you may experience the following symptoms:  Blood and/or small blood clots in the urine  Bladder spasms (frequency and urgency of urination)  Discomfort or aching in the back or side where the stent is  Burning or discomfort at the end of urine stream    To decrease these symptoms you should:  Take antispasmodic medication as prescribed (Detrol, Ditropan,  etc.)  Drink plenty of fluids but avoid caffeine and citrus (include cranberry)  If you are having discomfort in back or side, decrease activity    Please call your physician or the physician on call if you experience:  Fever greater than 101 degrees  Severe pain not relieved by pain medication or rest    Please make an appointment for the removal of the stent according to your physician's instructions.

## 2019-05-16 NOTE — PLAN OF CARE
Vitals stable and afebrile. Left flank pain eased with iv dilaudid x 2 this shift and also ativan x 1. Wife visiting and supportive. Plan npo for OR at 1120 tomorrow. Emesis of 200cc this evening after eating and then nausea passed and pt declined zofran. Sleeping between cares. Able to void spontaneously this evening.

## 2019-05-16 NOTE — PLAN OF CARE
PRIMARY DIAGNOSIS: ACUTE RENAL COLIC    OUTPATIENT/OBSERVATION GOALS TO BE MET BEFORE DISCHARGE  1. Pain Status: Improved but still requiring IV narcotics.    2. Tolerating adequate PO diet: No    3. Surgical Intervention planned: Yes    4. Cleared by consultants (if involved): No    5. Return to near baseline physical activity: Yes    Discharge Planner Nurse   Safe discharge environment identified: Yes  Barriers to discharge: No       Entered by: Bharti Levin 05/16/2019 4:33 AM     Please review provider order for any additional goals.   Nurse to notify provider when observation goals have been met and patient is ready for discharge.    Alert and oriented x4. Up with SBA.  Declining pain meds this shift. Rating left flank pain 3/10. Straining urine. No stones passed. SL. NPO. Cysto/lithotripsy/stent placement today at 1120.

## 2019-05-16 NOTE — ANESTHESIA CARE TRANSFER NOTE
Patient: Krystian Akins    Procedure(s):  cystoscopy, left ureteroscopy, left stent placement, left retrograde pyelogram and laser standby    Diagnosis: kidney stone  Diagnosis Additional Information: No value filed.    Anesthesia Type:   General, LMA     Note:  Airway :LMA and T-piece  Patient transferred to:PACU  Comments: Pt's VSS, A/O x3 resting comfortably post procedure, care continued by RN. Handoff Report: Identifed the Patient, Identified the Reponsible Provider, Reviewed the pertinent medical history, Discussed the surgical course, Reviewed Intra-OP anesthesia mangement and issues during anesthesia, Set expectations for post-procedure period and Allowed opportunity for questions and acknowledgement of understanding      Vitals: (Last set prior to Anesthesia Care Transfer)    CRNA VITALS  5/16/2019 1015 - 5/16/2019 1045      5/16/2019             Resp Rate (observed):  13                Electronically Signed By: ANASTASIA Diaz CRNA  May 16, 2019  10:45 AM

## 2019-05-16 NOTE — PLAN OF CARE
PRIMARY DIAGNOSIS: ACUTE RENAL COLIC    OUTPATIENT/OBSERVATION GOALS TO BE MET BEFORE DISCHARGE  1. Pain Status: Improved-controlled with oral pain medications.    2. Tolerating adequate PO diet: No: NPO prior to surgery    3. Surgical Intervention planned: Yes    4. Cleared by consultants (if involved): N/A    5. Return to near baseline physical activity: Yes    Discharge Planner Nurse   Safe discharge environment identified: Yes  Barriers to discharge: No       Entered by: Dario Muniz 05/16/2019 9:12 AM     Please review provider order for any additional goals.   Nurse to notify provider when observation goals have been met and patient is ready for discharge.  Pt sentd for urologic intervention, may discharge after intervention.

## 2019-05-16 NOTE — ANESTHESIA PREPROCEDURE EVALUATION
Anesthesia Pre-Procedure Evaluation    Patient: Krystian Akins   MRN: [id number] : 1960          Preoperative Diagnosis: kidney stone    Procedure(s):  cystoscopy, left ureteroscopy with holmium laser lithotripsy, left stent placement    Past Medical History:   Diagnosis Date     Gastric ulcer      Generalized anxiety disorder      GI bleed      HLD (hyperlipidemia)      Migraine      Nephrolithiasis      Past Surgical History:   Procedure Laterality Date     AS ESOPHAGOSCOPY, DIAGNOSTIC       Anesthesia Evaluation     . Pt has had prior anesthetic. Type: General    No history of anesthetic complications          ROS/MED HX    ENT/Pulmonary:  - neg pulmonary ROS     Neurologic:  - neg neurologic ROS     Cardiovascular:  - neg cardiovascular ROS       METS/Exercise Tolerance:     Hematologic:  - neg hematologic  ROS       Musculoskeletal:  - neg musculoskeletal ROS       GI/Hepatic:  - neg GI/hepatic ROS       Renal/Genitourinary:     (+) Nephrolithiasis ,       Endo:  - neg endo ROS       Psychiatric:     (+) psychiatric history depression      Infectious Disease:  - neg infectious disease ROS       Malignancy:      - no malignancy   Other:    - neg other ROS                      Physical Exam  Normal systems: cardiovascular, pulmonary and dental    Airway   Mallampati: III  TM distance: >3 FB  Neck ROM: full    Dental     Cardiovascular       Pulmonary             Lab Results   Component Value Date    WBC 11.4 (H) 2019    HGB 15.1 2019    HCT 44.4 2019     2019     2019    POTASSIUM 3.9 2019    CHLORIDE 107 2019    CO2 30 2019    BUN 15 2019    CR 1.55 (H) 2019     (H) 2019    TOD 8.7 2019    PTT 28 10/15/2008    INR 0.96 10/15/2008       Preop Vitals  BP Readings from Last 3 Encounters:   19 146/85    Pulse Readings from Last 3 Encounters:   05/15/19 63      Resp Readings from Last 3 Encounters:   19 16     SpO2 Readings from Last 3 Encounters:   05/16/19 93%      Temp Readings from Last 1 Encounters:   05/16/19 99.1  F (37.3  C) (Temporal)    Ht Readings from Last 1 Encounters:   No data found for Ht      Wt Readings from Last 1 Encounters:   05/15/19 83 kg (182 lb 15.7 oz)    There is no height or weight on file to calculate BMI.       Anesthesia Plan      History & Physical Review  History and physical reviewed and following examination; no interval change.    ASA Status:  1 .    NPO Status:  > 8 hours    Plan for General and LMA with Intravenous and Propofol induction. Maintenance will be Balanced.    PONV prophylaxis:  Ondansetron (or other 5HT-3) and Dexamethasone or Solumedrol       Postoperative Care  Postoperative pain management:  IV analgesics and Oral pain medications.      Consents  Anesthetic plan, risks, benefits and alternatives discussed with:  Patient or representative and Patient..                 Pepe Madrigal MD                    .

## 2019-05-16 NOTE — PROGRESS NOTES
[facility]    Hospitalist Progress Note  Name: Krystian Akins    MRN: [id number]  Provider: Courtney Granados MD  Date of Service: 05/16/2019    Assessment & Plan   Summary of Stay: Krystian Akins is a 59 year old male who was admitted on 5/15/2019 acute renal clonic and acute renal failure.  Past medical history significant for nephrolithiasis, gastric ulcer, GI bleed, hyperlipidemia, migraine resented with acute left flank pain.  CT abdomen pelvis showed 0.6 cm proximal left ureteral stone causing hydronephrosis.    Acute renal colic  --Left ureteral stone  --Status post cystoscopy ureteroscopy and stent placement 5/16/2019  --Patient seen in PACU tolerated procedure well  --Complains of dysuria  --Has been afebrile head but has renal insufficiency creatinine up to 1.55  --Expect improvement in renal functions with stent placement  --We will monitor creatinine  --Advance diet    Acute renal insufficiency  --Creatinine up to 1.55  --Likely obstructive secondary to kidney stone  --Expect improvement status post stent placement    Generalized anxiety disorder  --On citalopram    History of GI bleed  --No evidence of acute bleeding at this time  --Avoid NSAIDs  --Continue PPI    History of hyperlipidemia  --Not on any medications.      DVT Prophylaxis: Pneumatic Compression Devices  Code Status: No Order    Disposition: Expected discharge in 1 days to home      Interval History   Seen in PACU.  Complains of some discomfort.  Underwent procedure.  With no complications.  Review of all the symptoms are negative.    -Data reviewed today: I reviewed all new labs and imaging reports over the last 24 hours. I personally reviewed no images or EKG's today.    Physical Exam   Temp: 97.2  F (36.2  C) Temp src: Temporal BP: 130/82 Pulse: 93 Heart Rate: 96 Resp: 12 SpO2: 92 % O2 Device: Simple face mask Oxygen Delivery: 8 LPM  Vitals:    05/15/19 0610   Weight: 83 kg (182 lb 15.7 oz)     Vital Signs with Ranges  Temp:   [95.4  F (35.2  C)-99.2  F (37.3  C)] 97.2  F (36.2  C)  Pulse:  [63-93] 93  Heart Rate:  [74-99] 96  Resp:  [9-20] 12  BP: (100-147)/(61-85) 130/82  SpO2:  [92 %-99 %] 92 %  I/O last 3 completed shifts:  In: 596 [P.O.:200; I.V.:396]  Out: 1050 [Urine:1050]      GEN:  Alert, oriented x 3, in in PACU minimally uncomfortable because of dysuria HEENT:  Normocephalic/atraumatic, no scleral icterus, no nasal discharge, mouth moist.  CV:  Regular rate and rhythm, no murmur or JVD.  S1 + S2 noted, no S3 or S4.  LUNGS:  Clear to auscultation bilaterally without rales/rhonchi/wheezing/retractions.  Symmetric chest rise on inhalation noted.  ABD:  Active bowel sounds, soft, non-tender/non-distended.  No rebound/guarding/rigidity.  EXT:  No edema.  No cyanosis.    SKIN:  Dry to touch, no exanthems noted in the visualized areas.    Medications     lactated ringers 100 mL/hr at 05/16/19 1108       [Auto Hold] citalopram  20 mg Oral Daily     [Auto Hold] tamsulosin  0.4 mg Oral Daily     Data     Recent Labs   Lab 05/16/19  0722 05/15/19  0621   WBC 11.4* 8.5   HGB 15.1 16.5   HCT 44.4 47.4   MCV 93 90    205     Recent Labs   Lab 05/16/19  0722 05/15/19  0621    137   POTASSIUM 3.9 3.9   CHLORIDE 107 106   CO2 30 21   ANIONGAP 5 10   * 121*   BUN 15 15   CR 1.55* 1.16   GFRESTIMATED 49* 69   GFRESTBLACK 56* 80   TOD 8.7 9.4     No results for input(s): CULT in the last 168 hours.  No results for input(s): AST, ALT, GGT, ALKPHOS, BILITOTAL, BILICONJ, BILIDIRECT, IDANIA in the last 168 hours.    Invalid input(s): BILIRUBININDIRECT  No results for input(s): INR in the last 168 hours.    Recent Results (from the past 24 hour(s))   XR Surgery CELINA Fluoro L/T 5 Min w Stills    Narrative    This exam was marked as non-reportable because it will not be read by a   radiologist or a [place] non-radiologist provider.

## 2019-05-16 NOTE — DISCHARGE SUMMARY
[facility]  Discharge Summary  Hospitalist      Date of Admission:  5/15/2019  Date of Discharge:  5/16/2019  Provider:  Courtney Granados MD  Date of Service (when I last saw the patient): 05/16/19      Primary Provider: Tacos Araujo          Discharge Diagnosis:   Discharge Diagnoses   Acute renal colic  Acute renal failure    Other medical issues:  Past Medical History:   Diagnosis Date     Gastric ulcer      Generalized anxiety disorder      GI bleed      HLD (hyperlipidemia)      Migraine      Nephrolithiasis           History of Present Illness   Krystian Akins is an 59 year old male who presented with nausea and flank pain was admitted for acute renal colic and renal failure.  Please see the admission history and physical for full details.    Hospital Course     Krystian Akins was admitted on 5/15/2019 for acute renal colic and renal failure.  Patient's past medical history significant for nephrolithiasis, gastric ulcer, GI bleed, hyperlipidemia, migraine presented with acute left flank pain.  CT abdomen and pelvis showed 0.6 cm proximal left ureteral stone causing hydronephrosis.  The following problems were addressed during his hospitalization:    Acute renal colic  --Left ureteral stone  --Status post cystoscopy ureteroscopy and stent placement 5/16/2019  --Patient seen in PACU tolerated procedure well  --Complains of dysuria  --Has been afebrile head but has renal insufficiency creatinine up to 1.55  --Expect improvement in renal functions with stent placement  --Patient is follow-up with primary care provider to follow-up on renal functions.       Acute renal insufficiency  --Creatinine up to 1.55  --Likely obstructive secondary to kidney stone  --Expect improvement status post stent placement     Generalized anxiety disorder  --On citalopram     History of GI bleed  --No evidence of acute bleeding at this time  --Avoid NSAIDs  --Continue PPI     History of  hyperlipidemia  --Not on any medications.                Significant Results and Procedures   Status post ureteroscopy stent placement    Pending Results   Unresulted Labs Ordered in the Past 30 Days of this Admission     No orders found for last 61 day(s).          Code Status   Full Code       Primary Care Physician   [facility]    Physical Exam   Temp: 98.3  F (36.8  C) Temp src: Temporal BP: 134/83 Pulse: 89 Heart Rate: 96 Resp: 12 SpO2: 96 % O2 Device: None (Room air) Oxygen Delivery: 8 LPM  Vitals:    05/15/19 0610   Weight: 83 kg (182 lb 15.7 oz)     Vital Signs with Ranges  Temp:  [95.4  F (35.2  C)-99.2  F (37.3  C)] 98.3  F (36.8  C)  Pulse:  [63-93] 89  Heart Rate:  [74-99] 96  Resp:  [9-20] 12  BP: (100-147)/(61-85) 134/83  SpO2:  [92 %-99 %] 96 %  I/O last 3 completed shifts:  In: 596 [P.O.:200; I.V.:396]  Out: 1050 [Urine:1050]    Constitutional: Awake, alert, cooperative, no apparent distress, and appears stated age.  Respiratory: No increased work of breathing, good air exchange, clear to auscultation bilaterally, no crackles or wheezing.  Cardiovascular: Normal apical impulse,normal S1 and S2,   GI:  normal bowel sounds, soft, non-distended, non-tender, no masses palpated, no hepatosplenomegaly.      Discharge Disposition   Discharged to home    Consultations This Hospital Stay   UROLOGY IP CONSULT    Time Spent on this Encounter   ICourtney, personally saw the patient today and spent greater than 30 minutes discharging this patient.    Discharge Orders      No driving or operating machinery     until the day after procedure     No Alcohol    for 24 hours post procedure     Diet Instructions    Resume pre procedure diet     Encourage fluids    Encourage fluids at home to keep urine clear to light pink     Discharge Instructions    Patient to arrange for outpatient ureteroscopy and stone removal in 2 weeks.     Reason for your hospital stay    Renal colic.  Acute Renal  insufficiency     Follow-up and recommended labs and tests     [facility].  The following labs/tests are recommended: Renal functions.    Functions were high due to kidney stones.  Will need to follow-up.  Please follow-up with your primary care provider in 2 to 3 days so that they can follow-up on the kidney tests and make sure it is improving.    Avoid anti-inflammatory pain medications like Advil ibuprofen Aleve     Follow-up with urology in 2 weeks as scheduled    Please call or come if there are any new or worsening symptoms.     Activity    Your activity upon discharge: activity as tolerated     Full Code     Diet    Follow this diet upon discharge: None     Discharge Medications   Current Discharge Medication List      START taking these medications    Details   oxyCODONE (ROXICODONE) 5 MG tablet Take 1-2 tablets (5-10 mg) by mouth every 4 hours as needed for moderate to severe pain  Qty: 10 tablet, Refills: 0    Associated Diagnoses: Ureteral colic      senna-docusate (SENOKOT-S/PERICOLACE) 8.6-50 MG tablet Take 1-2 tablets by mouth 2 times daily  Qty: 30 tablet, Refills: 0    Associated Diagnoses: Ureteral colic      tamsulosin (FLOMAX) 0.4 MG capsule Take 1 capsule (0.4 mg) by mouth daily  Qty: 30 capsule, Refills: 0    Associated Diagnoses: Ureteral colic         CONTINUE these medications which have NOT CHANGED    Details   acetaminophen (TYLENOL) 325 MG tablet Take 325-650 mg by mouth every 6 hours as needed for mild pain      acetaminophen-caffeine (EXCEDRIN TENSION HEADACHE) 500-65 MG TABS Take 1 tablet by mouth every 6 hours as needed for mild pain      citalopram (CELEXA) 20 MG tablet Take 20 mg by mouth daily           Allergies   No Known Allergies  Data   Most Recent 3 CBC's:  Recent Labs   Lab Test 05/16/19  0722 05/15/19  0621   WBC 11.4* 8.5   HGB 15.1 16.5   MCV 93 90    205      Most Recent 3  BMP's:  Recent Labs   Lab Test 05/16/19  0722 05/15/19  0621    137   POTASSIUM 3.9 3.9   CHLORIDE 107 106   CO2 30 21   BUN 15 15   CR 1.55* 1.16   ANIONGAP 5 10   TOD 8.7 9.4   * 121*     Most Recent 2 LFT's:No lab results found.  Most Recent INR's and Anticoagulation Dosing History:  Anticoagulation Dose History     Recent Dosing and Labs Latest Ref Rng & Units 10/15/2008    INR 0.86 - 1.14 0.96        Most Recent 3 Troponin's:No lab results found.  Most Recent Cholesterol Panel:No lab results found.  Most Recent 6 Bacteria Isolates From Any Culture (See EPIC Reports for Culture Details):No lab results found.  Most Recent TSH, T4 and A1c Labs:No lab results found.  Results for orders placed or performed during the hospital encounter of 05/15/19   CT Abdomen Pelvis w/o Contrast    Narrative    CT ABDOMEN PELVIS W/O CONTRAST  5/15/2019 6:42 AM     HISTORY: Left flank pain. History of urinary stones.    TECHNIQUE: Noncontrast CT abdomen and pelvis was performed. Radiation  dose for this scan was reduced using automated exposure control,  adjustment of the mA and/or kV according to patient size, or iterative  reconstruction technique.    COMPARISON: 7/20/2009.    FINDINGS:  Abdomen: There is mild dilatation of the left renal collecting system  proximal ureter caused by a 0.6 cm proximal ureteral stone. There is  no other renal or ureteral stone on either side. The kidneys otherwise  appear normal.    The lung bases are unremarkable. The heart size is normal. Evaluation  of the solid abdominal organs is limited by the lack of intravenous  contrast. There is diffuse fatty infiltration of the liver. The  spleen, gallbladder, pancreas and adrenal glands are normal in  appearance. There is no abdominal or pelvic lymph node enlargement.    Pelvis: Bowel appears normal without obstruction. The appendix is  normal. No free intraperitoneal gas or fluid.      Impression    IMPRESSION:  1. There is a 0.6 cm  proximal left ureteral stone causing mild  hydronephrosis.  2. Fatty infiltration of the liver.    KAYCEE OLIVIER MD   XR Surgery CELINA Fluoro L/T 5 Min w Stills    Narrative    This exam was marked as non-reportable because it will not be read by a   radiologist or a [place] non-radiologist provider.                       Disclaimer: This note consists of symbols derived from keyboarding, dictation and/or voice recognition software. As a result, there may be errors in the script that have gone undetected. Please consider this when interpreting information found in this chart.

## 2019-05-16 NOTE — OR NURSING
Patient aware of recommendation from Dr. Granados to go have follow up BMP drawn at his clinic in 2-3 days. Patient and wife Haven verbalize understanding and agree to plan.

## 2019-05-16 NOTE — OP NOTE
DATE OF SERVICE: 5/16/2019  PREOPERATIVE DIAGNOSIS: Left nephrolithiasis  POSTOPERATIVE DIAGNOSIS: Left nephrolithiasis    PROCEDURES PERFORMED:   1. Cystourethroscopy  2. Left ureteroscopy with laser stand-by  3. Left retrograde pyelography with interpretation of intraoperative fluoroscopic imaging  4. Left ureteral stent placement    STAFF SURGEON: Enrique Parker MD  ANESTHESIA: General  ESTIMATED BLOOD LOSS: 1 cc  DRAINS/TUBES: Left 6 [place] x 26 cm double-J ureteral stent   COMPLICATIONS: None.   SPECIMEN: None  SIGNIFICANT FINDINGS: Diffusely narrow distal ureter. Unable to advance scope beyond distal 1/3 of ureter. Stone not able to be treated ureteroscopically today.    BRIEF OPERATIVE INDICATIONS: Krystian Akins is a 59 year old male who recently presented with left ureteral calculi. After a discussion of all risks, benefits, and alternatives, the patient elected to proceed with definitive stone management. The patient understands the potential need for more than one procedure to eliminate all stone burden.      DESCRIPTION OF PROCEDURE:  After informed consent was verified, the patient was transported to the operating room, placed supine on the table. After adequate anesthesia was induced, he was placed in dorsal lithotomy and prepped and draped in the usual sterile fashion. A timeout was taken to confirm correct patient, procedure and laterality. Pre-operative IV antibiotics were administered.    A 22 [place] rigid cystoscope was inserted per a well-lubricated urethra. The anterior urethra was unremarkable. The ureteral orifices were orthotopic bilaterally. The left ureteral orifice was identified and cannulated with a Sensor wire and 6-Fr open-ended catheter. The wire passed without resistance into the upper pole.  A retrograde pyelogram showed left proximal ureteral stone. Mild left hydronephrosis. Ureter distal to stone was notable for a diffusely narrow appearance. A semirigid ureteroscope was advanced  under direct vision alongside the safety wire. The scope was not able to be advanced to the level of the stone, secondary to narrowing in the distal ureter. There was no acute stricture, but the ureter was narrow at the level of the iliac vessels. A ureteral access sheath was advanced to about this same area where again stenosis was noted. A flexible scope was tried, but again, this was not able to be advanced to the level of the stone. A 6 Fr x 26 cm double-J stent was advanced over the Sensor wire, and a good proximal curl was seen in the renal pelvis and the distal curl was seen in the bladder. The bladder was drained.   The patient tolerated the procedure well.  No apparent complications. He was transported to the postanesthesia care unit in stable condition.      PLAN: Return in 2 weeks for cystoscopy and left ureteroscopy with stone removal.    Enrique Parker MD  Urology  [facility]  Clinic Phone [phone]

## 2019-05-16 NOTE — PLAN OF CARE
PRIMARY DIAGNOSIS: ACUTE RENAL COLIC    OUTPATIENT/OBSERVATION GOALS TO BE MET BEFORE DISCHARGE  1. Pain Status: Improved but still requiring IV narcotics.    2. Tolerating adequate PO diet: No    3. Surgical Intervention planned: Yes    4. Cleared by consultants (if involved): No    5. Return to near baseline physical activity: No    Discharge Planner Nurse   Safe discharge environment identified: Yes  Barriers to discharge: Yes       Entered by: Bharti Levin 05/16/2019 12:34 AM     Please review provider order for any additional goals.   Nurse to notify provider when observation goals have been met and patient is ready for discharge.

## 2019-05-16 NOTE — ANESTHESIA POSTPROCEDURE EVALUATION
Patient: Krystian Akins    Procedure(s):  cystoscopy, left ureteroscopy, left stent placement, left retrograde pyelogram and laser standby    Diagnosis:kidney stone  Diagnosis Additional Information: Left nephrolithiasis    Anesthesia Type:  General, LMA    Note:  Anesthesia Post Evaluation    Patient location during evaluation: PACU  Patient participation: Able to fully participate in evaluation  Level of consciousness: awake and alert  Pain management: adequate  Airway patency: patent  Cardiovascular status: acceptable  Respiratory status: acceptable  Hydration status: acceptable  PONV: none     Anesthetic complications: None          Last vitals:  Vitals:    05/16/19 1105 05/16/19 1115 05/16/19 1130   BP: 125/84 129/80 130/82   Pulse: 93     Resp: 13 15 12   Temp:      SpO2: 96% 99% 92%         Electronically Signed By: Pepe Madrigal MD  May 16, 2019  11:51 AM

## 2019-05-17 LAB — INTERPRETATION ECG - MUSE: NORMAL

## 2019-05-30 ENCOUNTER — ANESTHESIA (OUTPATIENT)
Dept: SURGERY | Facility: CLINIC | Age: 59
End: 2019-05-30
Payer: COMMERCIAL

## 2019-05-30 ENCOUNTER — APPOINTMENT (OUTPATIENT)
Dept: GENERAL RADIOLOGY | Facility: CLINIC | Age: 59
End: 2019-05-30
Attending: UROLOGY
Payer: COMMERCIAL

## 2019-05-30 ENCOUNTER — ANESTHESIA EVENT (OUTPATIENT)
Dept: SURGERY | Facility: CLINIC | Age: 59
End: 2019-05-30
Payer: COMMERCIAL

## 2019-05-30 ENCOUNTER — HOSPITAL ENCOUNTER (OUTPATIENT)
Facility: CLINIC | Age: 59
Discharge: HOME | End: 2019-05-30
Attending: UROLOGY | Admitting: UROLOGY
Payer: COMMERCIAL

## 2019-05-30 VITALS
HEIGHT: 72 IN | WEIGHT: 182 LBS | RESPIRATION RATE: 16 BRPM | HEART RATE: 86 BPM | DIASTOLIC BLOOD PRESSURE: 99 MMHG | OXYGEN SATURATION: 94 % | TEMPERATURE: 97 F | SYSTOLIC BLOOD PRESSURE: 147 MMHG | BODY MASS INDEX: 24.65 KG/M2

## 2019-05-30 DIAGNOSIS — N32.89 BLADDER SPASMS: ICD-10-CM

## 2019-05-30 DIAGNOSIS — N20.0 NEPHROLITHIASIS: Primary | ICD-10-CM

## 2019-05-30 LAB — COPATH REPORT: NORMAL

## 2019-05-30 PROCEDURE — 25000128 H RX IP 250 OP 636: Performed by: UROLOGY

## 2019-05-30 PROCEDURE — 25000128 H RX IP 250 OP 636: Performed by: NURSE ANESTHETIST, CERTIFIED REGISTERED

## 2019-05-30 PROCEDURE — 27211027 ZZHC OR PVP LASER FIBER OPNP: Performed by: UROLOGY

## 2019-05-30 PROCEDURE — C1769 GUIDE WIRE: HCPCS | Performed by: UROLOGY

## 2019-05-30 PROCEDURE — 37000008 ZZH ANESTHESIA TECHNICAL FEE, 1ST 30 MIN: Performed by: UROLOGY

## 2019-05-30 PROCEDURE — 27210794 ZZH OR GENERAL SUPPLY STERILE: Performed by: UROLOGY

## 2019-05-30 PROCEDURE — 36000060 ZZH SURGERY LEVEL 3 W FLUORO 1ST 30 MIN: Performed by: UROLOGY

## 2019-05-30 PROCEDURE — 25500064 ZZH RX 255 OP 636: Performed by: UROLOGY

## 2019-05-30 PROCEDURE — 74420 UROGRAPHY RTRGR +-KUB: CPT | Mod: 26 | Performed by: UROLOGY

## 2019-05-30 PROCEDURE — 88300 SURGICAL PATH GROSS: CPT | Performed by: UROLOGY

## 2019-05-30 PROCEDURE — 25800030 ZZH RX IP 258 OP 636: Performed by: ANESTHESIOLOGY

## 2019-05-30 PROCEDURE — 40000306 ZZH STATISTIC PRE PROC ASSESS II: Performed by: UROLOGY

## 2019-05-30 PROCEDURE — 25800025 ZZH RX 258: Performed by: UROLOGY

## 2019-05-30 PROCEDURE — 25000125 ZZHC RX 250: Performed by: NURSE ANESTHETIST, CERTIFIED REGISTERED

## 2019-05-30 PROCEDURE — 40000277 XR SURGERY CARM FLUORO LESS THAN 5 MIN W STILLS: Mod: TC

## 2019-05-30 PROCEDURE — 25000125 ZZHC RX 250: Performed by: ANESTHESIOLOGY

## 2019-05-30 PROCEDURE — 37000009 ZZH ANESTHESIA TECHNICAL FEE, EACH ADDTL 15 MIN: Performed by: UROLOGY

## 2019-05-30 PROCEDURE — 25000132 ZZH RX MED GY IP 250 OP 250 PS 637: Performed by: UROLOGY

## 2019-05-30 PROCEDURE — 82365 CALCULUS SPECTROSCOPY: CPT | Performed by: UROLOGY

## 2019-05-30 PROCEDURE — C2617 STENT, NON-COR, TEM W/O DEL: HCPCS | Performed by: UROLOGY

## 2019-05-30 PROCEDURE — 52356 CYSTO/URETERO W/LITHOTRIPSY: CPT | Mod: LT | Performed by: UROLOGY

## 2019-05-30 PROCEDURE — 71000012 ZZH RECOVERY PHASE 1 LEVEL 1 FIRST HR: Performed by: UROLOGY

## 2019-05-30 PROCEDURE — 25000125 ZZHC RX 250: Performed by: UROLOGY

## 2019-05-30 PROCEDURE — 71000027 ZZH RECOVERY PHASE 2 EACH 15 MINS: Performed by: UROLOGY

## 2019-05-30 PROCEDURE — 36000058 ZZH SURGERY LEVEL 3 EA 15 ADDTL MIN: Performed by: UROLOGY

## 2019-05-30 PROCEDURE — 88300 SURGICAL PATH GROSS: CPT | Mod: 26 | Performed by: UROLOGY

## 2019-05-30 DEVICE — STENT URETERAL POLARIS ULTRA 6FRX26CM M0061921330: Type: IMPLANTABLE DEVICE | Site: URETER | Status: FUNCTIONAL

## 2019-05-30 RX ORDER — OXYCODONE HYDROCHLORIDE 5 MG/1
5 TABLET ORAL ONCE
Status: COMPLETED | OUTPATIENT
Start: 2019-05-30 | End: 2019-05-30

## 2019-05-30 RX ORDER — NALOXONE HYDROCHLORIDE 0.4 MG/ML
.1-.4 INJECTION, SOLUTION INTRAMUSCULAR; INTRAVENOUS; SUBCUTANEOUS
Status: DISCONTINUED | OUTPATIENT
Start: 2019-05-30 | End: 2019-05-30 | Stop reason: HOSPADM

## 2019-05-30 RX ORDER — OXYCODONE HYDROCHLORIDE 5 MG/1
5-10 TABLET ORAL EVERY 4 HOURS PRN
Qty: 10 TABLET | Refills: 0 | Status: SHIPPED | OUTPATIENT
Start: 2019-05-30 | End: 2019-08-27

## 2019-05-30 RX ORDER — SODIUM CHLORIDE, SODIUM LACTATE, POTASSIUM CHLORIDE, CALCIUM CHLORIDE 600; 310; 30; 20 MG/100ML; MG/100ML; MG/100ML; MG/100ML
INJECTION, SOLUTION INTRAVENOUS CONTINUOUS
Status: DISCONTINUED | OUTPATIENT
Start: 2019-05-30 | End: 2019-05-30 | Stop reason: HOSPADM

## 2019-05-30 RX ORDER — DEXAMETHASONE SODIUM PHOSPHATE 4 MG/ML
INJECTION, SOLUTION INTRA-ARTICULAR; INTRALESIONAL; INTRAMUSCULAR; INTRAVENOUS; SOFT TISSUE PRN
Status: DISCONTINUED | OUTPATIENT
Start: 2019-05-30 | End: 2019-05-30

## 2019-05-30 RX ORDER — OXYBUTYNIN CHLORIDE 5 MG/1
5 TABLET, EXTENDED RELEASE ORAL DAILY
Qty: 15 TABLET | Refills: 0 | Status: SHIPPED | OUTPATIENT
Start: 2019-05-30 | End: 2019-08-27

## 2019-05-30 RX ORDER — PROPOFOL 10 MG/ML
INJECTION, EMULSION INTRAVENOUS PRN
Status: DISCONTINUED | OUTPATIENT
Start: 2019-05-30 | End: 2019-05-30

## 2019-05-30 RX ORDER — CEFAZOLIN SODIUM 2 G/100ML
2 INJECTION, SOLUTION INTRAVENOUS
Status: COMPLETED | OUTPATIENT
Start: 2019-05-30 | End: 2019-05-30

## 2019-05-30 RX ORDER — PROPOFOL 10 MG/ML
INJECTION, EMULSION INTRAVENOUS CONTINUOUS PRN
Status: DISCONTINUED | OUTPATIENT
Start: 2019-05-30 | End: 2019-05-30

## 2019-05-30 RX ORDER — CEFAZOLIN SODIUM 1 G/3ML
1 INJECTION, POWDER, FOR SOLUTION INTRAMUSCULAR; INTRAVENOUS SEE ADMIN INSTRUCTIONS
Status: DISCONTINUED | OUTPATIENT
Start: 2019-05-30 | End: 2019-05-30 | Stop reason: HOSPADM

## 2019-05-30 RX ORDER — ONDANSETRON 4 MG/1
4 TABLET, ORALLY DISINTEGRATING ORAL EVERY 30 MIN PRN
Status: DISCONTINUED | OUTPATIENT
Start: 2019-05-30 | End: 2019-05-30 | Stop reason: HOSPADM

## 2019-05-30 RX ORDER — GLYCOPYRROLATE 0.2 MG/ML
INJECTION, SOLUTION INTRAMUSCULAR; INTRAVENOUS PRN
Status: DISCONTINUED | OUTPATIENT
Start: 2019-05-30 | End: 2019-05-30

## 2019-05-30 RX ORDER — FENTANYL CITRATE 50 UG/ML
25-50 INJECTION, SOLUTION INTRAMUSCULAR; INTRAVENOUS
Status: DISCONTINUED | OUTPATIENT
Start: 2019-05-30 | End: 2019-05-30 | Stop reason: HOSPADM

## 2019-05-30 RX ORDER — LIDOCAINE 40 MG/G
CREAM TOPICAL
Status: DISCONTINUED | OUTPATIENT
Start: 2019-05-30 | End: 2019-05-30 | Stop reason: HOSPADM

## 2019-05-30 RX ORDER — METOPROLOL TARTRATE 1 MG/ML
1-2 INJECTION, SOLUTION INTRAVENOUS EVERY 5 MIN PRN
Status: DISCONTINUED | OUTPATIENT
Start: 2019-05-30 | End: 2019-05-30 | Stop reason: HOSPADM

## 2019-05-30 RX ORDER — ONDANSETRON 2 MG/ML
4 INJECTION INTRAMUSCULAR; INTRAVENOUS EVERY 30 MIN PRN
Status: DISCONTINUED | OUTPATIENT
Start: 2019-05-30 | End: 2019-05-30 | Stop reason: HOSPADM

## 2019-05-30 RX ORDER — ONDANSETRON 2 MG/ML
INJECTION INTRAMUSCULAR; INTRAVENOUS PRN
Status: DISCONTINUED | OUTPATIENT
Start: 2019-05-30 | End: 2019-05-30

## 2019-05-30 RX ORDER — FENTANYL CITRATE 50 UG/ML
INJECTION, SOLUTION INTRAMUSCULAR; INTRAVENOUS PRN
Status: DISCONTINUED | OUTPATIENT
Start: 2019-05-30 | End: 2019-05-30

## 2019-05-30 RX ADMIN — OXYCODONE HYDROCHLORIDE 5 MG: 5 TABLET ORAL at 13:14

## 2019-05-30 RX ADMIN — PROPOFOL 200 MG: 10 INJECTION, EMULSION INTRAVENOUS at 11:27

## 2019-05-30 RX ADMIN — MIDAZOLAM 2 MG: 1 INJECTION INTRAMUSCULAR; INTRAVENOUS at 11:21

## 2019-05-30 RX ADMIN — DEXAMETHASONE SODIUM PHOSPHATE 4 MG: 4 INJECTION, SOLUTION INTRA-ARTICULAR; INTRALESIONAL; INTRAMUSCULAR; INTRAVENOUS; SOFT TISSUE at 11:27

## 2019-05-30 RX ADMIN — FENTANYL CITRATE 100 MCG: 50 INJECTION, SOLUTION INTRAMUSCULAR; INTRAVENOUS at 11:27

## 2019-05-30 RX ADMIN — LIDOCAINE HYDROCHLORIDE 30 MG: 10 INJECTION, SOLUTION EPIDURAL; INFILTRATION; INTRACAUDAL; PERINEURAL at 11:27

## 2019-05-30 RX ADMIN — CEFAZOLIN SODIUM 2 G: 2 INJECTION, SOLUTION INTRAVENOUS at 11:21

## 2019-05-30 RX ADMIN — GLYCOPYRROLATE 0.2 MG: 0.2 INJECTION, SOLUTION INTRAMUSCULAR; INTRAVENOUS at 11:27

## 2019-05-30 RX ADMIN — SODIUM CHLORIDE, POTASSIUM CHLORIDE, SODIUM LACTATE AND CALCIUM CHLORIDE: 600; 310; 30; 20 INJECTION, SOLUTION INTRAVENOUS at 11:21

## 2019-05-30 RX ADMIN — PROPOFOL 75 MCG/KG/MIN: 10 INJECTION, EMULSION INTRAVENOUS at 11:36

## 2019-05-30 RX ADMIN — FENTANYL CITRATE 50 MCG: 50 INJECTION, SOLUTION INTRAMUSCULAR; INTRAVENOUS at 12:03

## 2019-05-30 RX ADMIN — ONDANSETRON 4 MG: 2 INJECTION INTRAMUSCULAR; INTRAVENOUS at 12:12

## 2019-05-30 ASSESSMENT — MIFFLIN-ST. JEOR: SCORE: 1683.55

## 2019-05-30 ASSESSMENT — ENCOUNTER SYMPTOMS
SEIZURES: 0
DYSRHYTHMIAS: 0

## 2019-05-30 NOTE — ANESTHESIA PREPROCEDURE EVALUATION
Anesthesia Pre-Procedure Evaluation    Patient: Krystian Akins   MRN: [id number] : 1960          Preoperative Diagnosis: KIDNEY STONES    Procedure(s):  Cystoureteroscopy with holmium laser lithotripsy, stone extraction, left retrograde pyleogram and left ureteral stent placement    Past Medical History:   Diagnosis Date     Arrhythmia     no s/s     Gastric ulcer      Generalized anxiety disorder      GI bleed      HLD (hyperlipidemia)      Migraine      Nephrolithiasis      Past Surgical History:   Procedure Laterality Date     AS ESOPHAGOSCOPY, DIAGNOSTIC       COMBINED CYSTOSCOPY, RETROGRADES, URETEROSCOPY, INSERT STENT Left 2019    Procedure: 1. Cystourethroscopy 2. Left ureteroscopy with laser stand-by 3. Left retrograde pyelography with interpretation of intraoperative fluoroscopic imaging 4. Left ureteral stent placement  ;  Surgeon: Enrique Parker MD;  Location: [facility] OR     Anesthesia Evaluation     .             ROS/MED HX    ENT/Pulmonary:      (-) asthma, sleep apnea, KYRIE risk factors and Other pulmonary disease   Neurologic:      (-) seizures, CVA, Other neuro hx and Delerium   Cardiovascular:        (-) hypertension, CHF, arrhythmias, pulmonary hypertension and dyslipidemia   METS/Exercise Tolerance:     Hematologic:        (-) anemia   Musculoskeletal:        (-) arthritis   GI/Hepatic:        (-) GERD, hiatal hernia and hepatitis   Renal/Genitourinary:     (+) Nephrolithiasis ,       Endo:      (-) Type I DM, Type II DM, thyroid disease, chronic steroid usage, other endocrine disorder and obesity   Psychiatric:     (+) psychiatric history depression      Infectious Disease:  - neg infectious disease ROS       Malignancy:      - no malignancy   Other:    - neg other ROS                      Physical Exam      Airway   Mallampati: II  TM distance: >3 FB  Neck ROM: full    Dental     Cardiovascular   Rhythm and rate: regular and normal  (-) no murmur    Pulmonary    breath  sounds clear to auscultation    Other findings: Lab Test        05/16/19     05/16/19     05/15/19                       1258          0722          0621          WBC          12.1*        11.4*        8.5           HGB          15.3         15.1         16.5          MCV          94           93           90            PLT          171          163          205            Lab Test        05/16/19     05/15/19                       0722          0621          NA           142          137           POTASSIUM    3.9          3.9           CHLORIDE     107          106           CO2          30           21            BUN          15           15            CR           1.55*        1.16          ANIONGAP     5            10            TOD          8.7          9.4           GLC          113*         121*                Lab Results   Component Value Date    WBC 12.1 (H) 05/16/2019    HGB 15.3 05/16/2019    HCT 45.6 05/16/2019     05/16/2019     05/16/2019    POTASSIUM 3.9 05/16/2019    CHLORIDE 107 05/16/2019    CO2 30 05/16/2019    BUN 15 05/16/2019    CR 1.55 (H) 05/16/2019     (H) 05/16/2019    TOD 8.7 05/16/2019    PTT 28 10/15/2008    INR 0.96 10/15/2008       Preop Vitals  BP Readings from Last 3 Encounters:   05/30/19 130/87   05/16/19 136/86    Pulse Readings from Last 3 Encounters:   05/30/19 64   05/16/19 77      Resp Readings from Last 3 Encounters:   05/30/19 16   05/16/19 14    SpO2 Readings from Last 3 Encounters:   05/30/19 98%   05/16/19 98%      Temp Readings from Last 1 Encounters:   05/30/19 97.5  F (36.4  C) (Temporal)    Ht Readings from Last 1 Encounters:   05/30/19 1.829 m (6')      Wt Readings from Last 1 Encounters:   05/30/19 82.6 kg (182 lb)    Estimated body mass index is 24.68 kg/m  as calculated from the following:    Height as of this encounter: 1.829 m (6').    Weight as of this encounter: 82.6 kg (182 lb).       Anesthesia Plan      History & Physical  Review  History and physical reviewed and following examination; no interval change.    ASA Status:  1 .    NPO Status:  > 8 hours    Plan for General and LMA with Propofol induction. Maintenance will be Balanced.    PONV prophylaxis:  Ondansetron (or other 5HT-3) and Dexamethasone or Solumedrol       Postoperative Care  Postoperative pain management:  IV analgesics and Oral pain medications.      Consents  Anesthetic plan, risks, benefits and alternatives discussed with:  Patient..                 Selvin Sánchez MD                    .

## 2019-05-30 NOTE — OP NOTE
DATE OF SERVICE: 5/30/2019  PREOPERATIVE DIAGNOSIS: Left nephrolithiasis  POSTOPERATIVE DIAGNOSIS: Left nephrolithiasis    PROCEDURES PERFORMED:   1. Cystourethroscopy  2. Left ureteroscopy with holmium laser lithotripsy and stone extraction  3. Left retrograde pyelography with interpretation of intraoperative fluoroscopic imaging  4. Left ureteral stent exchange    STAFF SURGEON: Enrique Parker MD  ANESTHESIA: General  ESTIMATED BLOOD LOSS: 1 cc  DRAINS/TUBES: Left 6 [place] citizen x 26 cm double-J ureteral stent   COMPLICATIONS: None.   SPECIMEN: Stone for analysis  SIGNIFICANT FINDINGS: Left ureteral stone noted in mid ureter. Fragmented and removed without complication. Stone free at end of case.    BRIEF OPERATIVE INDICATIONS: Krystian Akins is a 59 year old male who recently presented with left ureteral calculi. After a discussion of all risks, benefits, and alternatives, the patient elected to proceed with definitive stone management. The patient understands the potential need for more than one procedure to eliminate all stone burden.      DESCRIPTION OF PROCEDURE:  After informed consent was verified, the patient was transported to the operating room, placed supine on the table. After adequate anesthesia was induced, he was placed in dorsal lithotomy and prepped and draped in the usual sterile fashion. A timeout was taken to confirm correct patient, procedure and laterality. Pre-operative IV antibiotics were administered.    A 22 [place] citizen rigid cystoscope was inserted per a well-lubricated urethra. The anterior urethra was unremarkable. The ureteral orifices were orthotopic bilaterally.  Previous stent was pulled to the urethral meatus and wire advanced through this into renal pelvis. Stent removed intact. The left ureteral orifice was identified and cannulated with a Sensor wire and 6-Fr open-ended catheter. The wire passed without resistance into the upper pole.  A retrograde pyelogram showed mid ureteral stone, but  no hydronephrosis, or other filling defects. A semirigid ureteroscope was advanced under direct vision alongside the safety wire to the level of the stone. A solitary, 6 mm stone was noted in the mid left ureter. A 200 micron laser fiber at settings of 0.6 J and 6 Hz was used to fragment the stone. All stones >1mm were basketed and withdrawn. Scope was advanced all the way to the left ureteropelvic junction and no stone fragments remained. Pullback ureteroscopy was performed and showed no retained stone fragments or ureteral injury. A 6 Fr x 26 cm double-J stent was advanced over the Sensor wire, and a good proximal curl was seen in the renal pelvis and the distal curl was seen in the bladder. The bladder was drained.   The patient tolerated the procedure well.  No apparent complications. He was transported to the postanesthesia care unit in stable condition.      PLAN: Return in 1-2 weeks for cystoscopy and ureteral stent removal in the office.    Enrique Parker MD  Urology  [facility]  Clinic Phone [phone]

## 2019-05-30 NOTE — ANESTHESIA POSTPROCEDURE EVALUATION
Patient: Krystian Akins    Procedure(s):  Cystoureteroscopy with holmium laser lithotripsy, stone extraction, left retrograde pyleogram and left ureteral stent exchange    Diagnosis:KIDNEY STONES  Diagnosis Additional Information: DATE OF SERVICE: 5/30/2019  PREOPERATIVE DIAGNOSIS: Left nephrolithiasis  POSTOPERATIVE DIAGNOSIS: Left nephrolithiasis    PROCEDURES PERFORMED:   1. Cystourethroscopy  2. Left ureteroscopy with holmium laser lithotripsy and stone extraction  3. Left,  retrograde pyelography with interpretation of intraoperative fluoroscopic imaging  4. Left ureteral stent exchange        Anesthesia Type:  General, LMA    Note:  Anesthesia Post Evaluation    Patient location during evaluation: PACU  Patient participation: Able to fully participate in evaluation  Level of consciousness: awake  Pain management: adequate  Airway patency: patent  Cardiovascular status: acceptable  Respiratory status: acceptable  Hydration status: euvolemic  PONV: controlled     Anesthetic complications: None          Last vitals:  Vitals:    05/30/19 1226 05/30/19 1230 05/30/19 1235   BP: 112/74 112/74 123/78   Pulse:  71 74   Resp: 12 11 19   Temp:  97.4  F (36.3  C)    SpO2: 100% 100% 95%         Electronically Signed By: Selvin Sánchez MD  May 30, 2019  12:45 PM

## 2019-05-30 NOTE — DISCHARGE INSTRUCTIONS
CYSTOSCOPY DISCHARGE INSTRUCTIONS  [facility]  [address]  [phone]    YOU MAY GO BACK TO YOUR NORMAL DIET AND ACTIVITY, UNLESS YOUR DOCTOR TELLS YOU NOT TO.    FOR THE NEXT TWO DAYS, YOU MAY NOTICE:    SOME BLOOD IN YOUR URINE.  SOME BURNING WHEN YOU URINATE.  AN URGE TO URINATE MORE OFTEN.  BLADDER SPASMS.    THESE ARE NORMAL AFTER THE PROCEDURE.  THEY SHOULD GO AWAY AFTER A DAY OR TWO.  TO RELIEVE THESE PROBLEMS:     DRINK 6 TO 8 LARGE GLASSES OF WATER EACH DAY (INCLUDES DRINKS AT MEALS).  THIS WILL HELP CLEAR THE URINE.    TAKE WARM BATHS TO RELIEVE PAIN AND BLADDER SPASMS.  DO NOT ADD ANYTHING TO THE BATH WATER.    YOUR DOCTOR MAY PRESCRIBE PAIN MEDICINE.  YOU MAY ALSO TAKE TYLENOL (ACETAMINOPHEN) FOR PAIN.    CALL YOUR SURGEON IF YOU HAVE:    A FEVER OVER 101 DEGREES.  CHECK YOUR TEMPERATURE UNDER YOUR TONGUE.    CHILLS.    FAILURE TO URINATE (NO URINE COMES OUT WHEN YOU TRY TO USE THE TOILET).  TRY SOAKING IN A BATHTUB FULL OF WARM WATER.  IF STILL NO URINE, CALL YOUR DOCTOR.    A LOT OF BLOOD IN THE URINE, OR BLOOD CLOTS LARGER THAN A NICKEL.      PAIN IN THE BACK OR BELLY AREA (ABDOMEN).    PAIN OR SPASMS THAT ARE NOT RELIEVED BY WARM TUB BATHS AND PAIN MEDICINE.      SEVERE PAIN, BURNING OR OTHER PROBLEMS WHILE PASSING URINE.    PAIN THAT GETS WORSE AFTER TWO DAYS.       STENT INFORMATION/DISCHARGE INSTRUCTIONS  [facility]  [address]  [phone]    During surgery, a stent may be placed in the ureter.  The ureter is the tube that drains urine from the kidney to the bladder.  The stent is placed to dilate (open) the ureter so stone fragments can pass easily through the ureter or to decrease ureteral swelling after surgery or to relieve an obstruction.      The stent is made of silicone.  The upper end of the stent curls in the kidney while the lower end rests in the bladder.    While the stent is in place you may experience the  following symptoms:  Blood and/or small blood clots in the urine  Bladder spasms (frequency and urgency of urination)  Discomfort or aching in the back or side where the stent is  Burning or discomfort at the end of urine stream    To decrease these symptoms you should:  Take antispasmodic medication as prescribed (Detrol, Ditropan, etc.)  Drink plenty of fluids but avoid caffeine and citrus (include cranberry)  If you are having discomfort in back or side, decrease activity    Please call your physician or the physician on call if you experience:  Fever greater than 101 degrees  Severe pain not relieved by pain medication or rest    Please make an appointment for the removal of the stent according to your physician's instructions.       GENERAL ANESTHESIA OR SEDATION ADULT DISCHARGE INSTRUCTIONS   SPECIAL PRECAUTIONS FOR 24 HOURS AFTER SURGERY    IT IS NOT UNUSUAL TO FEEL LIGHT-HEADED OR FAINT, UP TO 24 HOURS AFTER SURGERY OR WHILE TAKING PAIN MEDICATION.  IF YOU HAVE THESE SYMPTOMS; SIT FOR A FEW MINUTES BEFORE STANDING AND HAVE SOMEONE ASSIST YOU WHEN YOU GET UP TO WALK OR USE THE BATHROOM.    YOU SHOULD REST AND RELAX FOR THE NEXT 24 HOURS AND YOU MUST MAKE ARRANGEMENTS TO HAVE SOMEONE STAY WITH YOU FOR AT LEAST 24 HOURS AFTER YOUR DISCHARGE.  AVOID HAZARDOUS AND STRENUOUS ACTIVITIES.  DO NOT MAKE IMPORTANT DECISIONS FOR 24 HOURS.    DO NOT DRIVE ANY VEHICLE OR OPERATE MECHANICAL EQUIPMENT FOR 24 HOURS FOLLOWING THE END OF YOUR SURGERY.  EVEN THOUGH YOU MAY FEEL NORMAL, YOUR REACTIONS MAY BE AFFECTED BY THE MEDICATION YOU HAVE RECEIVED.    DO NOT DRINK ALCOHOLIC BEVERAGES FOR 24 HOURS FOLLOWING YOUR SURGERY.    DRINK CLEAR LIQUIDS (APPLE JUICE, GINGER ALE, 7-UP, BROTH, ETC.).  PROGRESS TO YOUR REGULAR DIET AS YOU FEEL ABLE.    YOU MAY HAVE A DRY MOUTH, A SORE THROAT, MUSCLES ACHES OR TROUBLE SLEEPING.  THESE SHOULD GO AWAY AFTER 24 HOURS.    CALL YOUR DOCTOR FOR ANY OF THE FOLLOWING:  SIGNS OF INFECTION (FEVER,  GROWING TENDERNESS AT THE SURGERY SITE, A LARGE AMOUNT OF DRAINAGE OR BLEEDING, SEVERE PAIN, FOUL-SMELLING DRAINAGE, REDNESS OR SWELLING.    IT HAS BEEN OVER 8 TO 10 HOURS SINCE SURGERY AND YOU ARE STILL NOT ABLE TO URINATE (PASS WATER).       You received 1 tablet of oxycodone pain medication today at 1:15pm

## 2019-05-30 NOTE — ANESTHESIA CARE TRANSFER NOTE
Patient: Krystian Akins    Procedure(s):  Cystoureteroscopy with holmium laser lithotripsy, stone extraction, left retrograde pyleogram and left ureteral stent exchange    Diagnosis: KIDNEY STONES  Diagnosis Additional Information: No value filed.    Anesthesia Type:   General, LMA     Note:  Airway :Face Mask  Patient transferred to:PACU  Comments: At end of procedure, spontaneous respirations, adequate tidal volumes, followed commands to voice, LMA removed atraumatically, oropharynx suctioned, airway patent after LMA removal. Oxygen via facemask at 6 liters per minute to PACU. Oxygen tubing connected to wall O2 in PACU, SpO2, NiBP, and EKG monitors and alarms on and functioning, Rosalva Hugger warmer connected to patient gown, report on patient's clinical status given to PACU RN, RN questions answered.Handoff Report: Identifed the Patient, Identified the Reponsible Provider, Reviewed the pertinent medical history, Discussed the surgical course, Reviewed Intra-OP anesthesia mangement and issues during anesthesia, Set expectations for post-procedure period and Allowed opportunity for questions and acknowledgement of understanding      Vitals: (Last set prior to Anesthesia Care Transfer)    CRNA VITALS  5/30/2019 1155 - 5/30/2019 1231      5/30/2019             NIBP:  112/68    Pulse:  67    NIBP Mean:  82    SpO2:  98 %    Resp Rate (observed):  6  (Abnormal)           112/74-70-%-97.1f      Electronically Signed By: ANASTASIA Rushing CRNA  May 30, 2019  12:31 PM

## 2019-06-02 LAB
APPEARANCE STONE: NORMAL
COMPN STONE: NORMAL
NUMBER STONE: 2
SIZE STONE: NORMAL MM
WT STONE: 35 MG

## 2019-06-10 ENCOUNTER — OFFICE VISIT (OUTPATIENT)
Dept: UROLOGY | Facility: CLINIC | Age: 59
End: 2019-06-10
Payer: COMMERCIAL

## 2019-06-10 VITALS
HEIGHT: 72 IN | DIASTOLIC BLOOD PRESSURE: 84 MMHG | HEART RATE: 66 BPM | OXYGEN SATURATION: 96 % | BODY MASS INDEX: 24.38 KG/M2 | SYSTOLIC BLOOD PRESSURE: 128 MMHG | WEIGHT: 180 LBS

## 2019-06-10 DIAGNOSIS — N20.0 NEPHROLITHIASIS: Primary | ICD-10-CM

## 2019-06-10 DIAGNOSIS — Z79.2 PROPHYLACTIC ANTIBIOTIC: ICD-10-CM

## 2019-06-10 PROBLEM — N23 URETERAL COLIC: Status: RESOLVED | Noted: 2019-05-15 | Resolved: 2019-06-10

## 2019-06-10 PROCEDURE — 52310 CYSTOSCOPY AND TREATMENT: CPT | Mod: 58 | Performed by: UROLOGY

## 2019-06-10 RX ORDER — CIPROFLOXACIN 500 MG/1
500 TABLET, FILM COATED ORAL ONCE
Qty: 1 TABLET | Refills: 0 | Status: SHIPPED | OUTPATIENT
Start: 2019-06-10 | End: 2019-08-27

## 2019-06-10 RX ORDER — LIDOCAINE HYDROCHLORIDE 20 MG/ML
JELLY TOPICAL ONCE
Status: DISCONTINUED | OUTPATIENT
Start: 2019-06-10 | End: 2019-06-10 | Stop reason: HOSPADM

## 2019-06-10 ASSESSMENT — MIFFLIN-ST. JEOR: SCORE: 1674.47

## 2019-06-10 ASSESSMENT — PAIN SCALES - GENERAL: PAINLEVEL: NO PAIN (0)

## 2019-06-10 NOTE — PATIENT INSTRUCTIONS
"AFTER YOUR CYSTOSCOPY  ?  ?  You have just completed a cystoscopy, or \"cysto\", which allowed your physician to learn more about your bladder (or to remove a stent placed after surgery). We suggest that you continue to avoid caffeine, fruit juice, and alcohol for the next 24 hours, however, you are encouraged to return to your normal activities.  ?  ?  A few things that are considered normal after your cystoscopy:  ?  * small amount of bleeding (or spotting) that clears within the next 24 hours  ?  * slight burning sensation with urination  ?  * sensation of needing to void (urinate) more frequently  ?  * the feeling of \"air\" in your urine  ?  * mild discomfort that is relieved with Tylenol    * bladder spasms  ?  ?  ?  Please contact our office promptly if you:  ?  * develop a fever above 101 degrees  ?  * are unable to urinate  ?  * develop bright red blood that does not stop  ?  * experience severe pain or swelling  ?  ?  ?  And of course, please contact our office with any concerns or questions [phone]  ?    AFTER YOUR CYSTOSCOPY        You have just completed a cystoscopy, or \"cysto\", which allowed your physician to learn more about your bladder (or to remove a stent placed after surgery). We suggest that you continue to avoid caffeine, fruit juice, and alcohol for the next 24 hours, however, you are encouraged to return to your normal activities.         A few things that are considered normal after your cystoscopy:     * Small amount of bleeding (or spotting) that clears within the next 24 hours     * Slight burning sensation with urination     * Sensation to of needing to avoid more frequently     * The feeling of \"air\" in your urine     * Mild discomfort that is relieved with Tylenol        Please contact our office promptly if you:     * Develop a fever above 101 degrees     * Are unable to urinate     * Develop bright red blood that does not stop     * Severe pain or swelling         Please contact " our office with any concerns or questions @[facility].

## 2019-06-10 NOTE — NURSING NOTE
Chief Complaint   Patient presents with     Cystoscopy     Pt here for stent removal     Prior to the start of the procedure and with procedural staff participation, I verbally confirmed the patient s identity using two indicators, relevant allergies, that the procedure was appropriate and matched the consent or emergent situation, and that the correct equipment/implants were available. Immediately prior to starting the procedure I conducted the Time Out with the procedural staff and re-confirmed the patient s name, procedure, and site/side. I have wiped the patient off with the povidone-Iodine solution, draped them,  used Lidocaine hydrochloride jelly, and instilled sterile water into the bladder. (The Joint Commission universal protocol was followed.)  Yes    Sedation (Moderate or Deep): None  5mL 2% lidocaine hydrochloride Urojet instilled into urethra.    NDC# [address]-87  Lot #: [id number]  Expiration Date:      Elsa Diaz CMA

## 2019-06-10 NOTE — PROGRESS NOTES
Krystian Akins is a 59 year old male with an indwelling ureteral stent in need of removal. Stone free at end of case.    90% calcium oxalate monohydrate, and   10% calcium oxalate dihydrate.     CYSTOSCOPY PROCEDURE:  After sterile preparation and draping of the patient,  a 17-[place] flexible cystoscope was introduced via the urethra.  It was passed without difficulty into the bladder.  The urethra was open without evidence of stricture.  The ureteral orifices were orthotopic.  The double J stent was seen coming out the left side.  It was grasped with an alligator forceps and extracted intact without difficulty.  The patient tolerated the procedure well    A/P Successful stent removal  Prophylactic antibiotic ordered  Stone prevention counseling provided today    Watch for any new onset fevers, signs of UTI.  May expect some pain after removal.  If this is severe, or last many hours, you may need to return for replacement of stent.    Enrique Parker MD  Urology  [facility]

## 2019-08-27 ENCOUNTER — OFFICE VISIT (OUTPATIENT)
Dept: INTERNAL MEDICINE | Facility: CLINIC | Age: 59
End: 2019-08-27
Payer: COMMERCIAL

## 2019-08-27 VITALS
OXYGEN SATURATION: 96 % | HEART RATE: 58 BPM | TEMPERATURE: 97.4 F | DIASTOLIC BLOOD PRESSURE: 80 MMHG | RESPIRATION RATE: 16 BRPM | BODY MASS INDEX: 24.95 KG/M2 | SYSTOLIC BLOOD PRESSURE: 126 MMHG | WEIGHT: 184 LBS

## 2019-08-27 DIAGNOSIS — Z87.442 HISTORY OF KIDNEY STONES: ICD-10-CM

## 2019-08-27 DIAGNOSIS — F41.9 ANXIETY: ICD-10-CM

## 2019-08-27 DIAGNOSIS — L98.9 SKIN DISORDER: ICD-10-CM

## 2019-08-27 DIAGNOSIS — Z23 NEED FOR PROPHYLACTIC VACCINATION WITH TETANUS-DIPHTHERIA (TD): ICD-10-CM

## 2019-08-27 DIAGNOSIS — Z12.5 SCREENING FOR PROSTATE CANCER: ICD-10-CM

## 2019-08-27 DIAGNOSIS — Z00.01 ENCOUNTER FOR ROUTINE ADULT MEDICAL EXAM WITH ABNORMAL FINDINGS: Primary | ICD-10-CM

## 2019-08-27 DIAGNOSIS — Z12.11 SPECIAL SCREENING FOR MALIGNANT NEOPLASMS, COLON: ICD-10-CM

## 2019-08-27 LAB
ALBUMIN SERPL-MCNC: 4.6 G/DL (ref 3.4–5)
ALP SERPL-CCNC: 68 U/L (ref 40–150)
ALT SERPL W P-5'-P-CCNC: 57 U/L (ref 0–70)
ANION GAP SERPL CALCULATED.3IONS-SCNC: 5 MMOL/L (ref 3–14)
AST SERPL W P-5'-P-CCNC: 28 U/L (ref 0–45)
BILIRUB SERPL-MCNC: 0.8 MG/DL (ref 0.2–1.3)
BUN SERPL-MCNC: 15 MG/DL (ref 7–30)
CALCIUM SERPL-MCNC: 9.2 MG/DL (ref 8.5–10.1)
CHLORIDE SERPL-SCNC: 106 MMOL/L (ref 94–109)
CHOLEST SERPL-MCNC: 287 MG/DL
CO2 SERPL-SCNC: 29 MMOL/L (ref 20–32)
CREAT SERPL-MCNC: 0.92 MG/DL (ref 0.66–1.25)
DEPRECATED CALCIDIOL+CALCIFEROL SERPL-MC: 20 UG/L (ref 20–75)
ERYTHROCYTE [DISTWIDTH] IN BLOOD BY AUTOMATED COUNT: 12.3 % (ref 10–15)
GFR SERPL CREATININE-BSD FRML MDRD: >90 ML/MIN/{1.73_M2}
GLUCOSE SERPL-MCNC: 100 MG/DL (ref 70–99)
HCT VFR BLD AUTO: 47.4 % (ref 40–53)
HDLC SERPL-MCNC: 51 MG/DL
HGB BLD-MCNC: 16.2 G/DL (ref 13.3–17.7)
LDLC SERPL CALC-MCNC: 210 MG/DL
MCH RBC QN AUTO: 31.4 PG (ref 26.5–33)
MCHC RBC AUTO-ENTMCNC: 34.2 G/DL (ref 31.5–36.5)
MCV RBC AUTO: 92 FL (ref 78–100)
NONHDLC SERPL-MCNC: 236 MG/DL
PLATELET # BLD AUTO: 192 10E9/L (ref 150–450)
POTASSIUM SERPL-SCNC: 4.3 MMOL/L (ref 3.4–5.3)
PROT SERPL-MCNC: 8 G/DL (ref 6.8–8.8)
PSA SERPL-ACNC: 1.96 UG/L (ref 0–4)
RBC # BLD AUTO: 5.16 10E12/L (ref 4.4–5.9)
SODIUM SERPL-SCNC: 140 MMOL/L (ref 133–144)
TRIGL SERPL-MCNC: 132 MG/DL
WBC # BLD AUTO: 7 10E9/L (ref 4–11)

## 2019-08-27 PROCEDURE — G0103 PSA SCREENING: HCPCS | Performed by: INTERNAL MEDICINE

## 2019-08-27 PROCEDURE — 36415 COLL VENOUS BLD VENIPUNCTURE: CPT | Performed by: INTERNAL MEDICINE

## 2019-08-27 PROCEDURE — 85027 COMPLETE CBC AUTOMATED: CPT | Performed by: INTERNAL MEDICINE

## 2019-08-27 PROCEDURE — 90471 IMMUNIZATION ADMIN: CPT | Performed by: INTERNAL MEDICINE

## 2019-08-27 PROCEDURE — 90714 TD VACC NO PRESV 7 YRS+ IM: CPT | Performed by: INTERNAL MEDICINE

## 2019-08-27 PROCEDURE — 99386 PREV VISIT NEW AGE 40-64: CPT | Mod: 25 | Performed by: INTERNAL MEDICINE

## 2019-08-27 PROCEDURE — 80053 COMPREHEN METABOLIC PANEL: CPT | Performed by: INTERNAL MEDICINE

## 2019-08-27 PROCEDURE — 80061 LIPID PANEL: CPT | Performed by: INTERNAL MEDICINE

## 2019-08-27 PROCEDURE — 82306 VITAMIN D 25 HYDROXY: CPT | Performed by: INTERNAL MEDICINE

## 2019-08-27 PROCEDURE — 99213 OFFICE O/P EST LOW 20 MIN: CPT | Mod: 25 | Performed by: INTERNAL MEDICINE

## 2019-08-27 RX ORDER — CLONAZEPAM 1 MG/1
1 TABLET ORAL 2 TIMES DAILY PRN
Qty: 20 TABLET | Refills: 0 | Status: SHIPPED | OUTPATIENT
Start: 2019-08-27 | End: 2021-02-05

## 2019-08-27 RX ORDER — CITALOPRAM HYDROBROMIDE 20 MG/1
20 TABLET ORAL DAILY
Qty: 90 TABLET | Refills: 3 | Status: SHIPPED | OUTPATIENT
Start: 2019-08-27 | End: 2020-08-18

## 2019-08-27 SDOH — HEALTH STABILITY: MENTAL HEALTH: HOW OFTEN DO YOU HAVE A DRINK CONTAINING ALCOHOL?: 2-3 TIMES A WEEK

## 2019-08-27 ASSESSMENT — ANXIETY QUESTIONNAIRES
6. BECOMING EASILY ANNOYED OR IRRITABLE: NOT AT ALL
GAD7 TOTAL SCORE: 2
7. FEELING AFRAID AS IF SOMETHING AWFUL MIGHT HAPPEN: NOT AT ALL
IF YOU CHECKED OFF ANY PROBLEMS ON THIS QUESTIONNAIRE, HOW DIFFICULT HAVE THESE PROBLEMS MADE IT FOR YOU TO DO YOUR WORK, TAKE CARE OF THINGS AT HOME, OR GET ALONG WITH OTHER PEOPLE: NOT DIFFICULT AT ALL
2. NOT BEING ABLE TO STOP OR CONTROL WORRYING: NOT AT ALL
1. FEELING NERVOUS, ANXIOUS, OR ON EDGE: SEVERAL DAYS
5. BEING SO RESTLESS THAT IT IS HARD TO SIT STILL: NOT AT ALL
3. WORRYING TOO MUCH ABOUT DIFFERENT THINGS: SEVERAL DAYS

## 2019-08-27 ASSESSMENT — PATIENT HEALTH QUESTIONNAIRE - PHQ9: 5. POOR APPETITE OR OVEREATING: NOT AT ALL

## 2019-08-27 NOTE — PROGRESS NOTES
SUBJECTIVE:   CC: Krystian Akins is an 59 year old male who presents for preventative health visit and establishment of care and f/u re: anxiety hx.  Present followed by [facility].     Healthy Habits:     Getting at least 3 servings of Calcium per day:  Yes    Bi-annual eye exam:  NO    Dental care twice a year:  Yes    Sleep apnea or symptoms of sleep apnea:  None    Diet:  Regular (no restrictions)    Frequency of exercise:  4-5 days/week    Duration of exercise:  15-30 minutes    Taking medications regularly:  Yes    Barriers to taking medications:  None    Medication side effects:  None    PHQ-2 Total Score: 0    Additional concerns today:  Yes              Today's PHQ-2 Score:   PHQ-2 ( 1999 Pfizer) 6/10/2019   Q1: Little interest or pleasure in doing things 0   Q2: Feeling down, depressed or hopeless 0   PHQ-2 Score 0       Abuse: Current or Past(Physical, Sexual or Emotional)- No  Do you feel safe in your environment? Yes    Social History     Tobacco Use     Smoking status: Never Smoker     Smokeless tobacco: Never Used   Substance Use Topics     Alcohol use: Yes     Comment: 2 or 3 drinks a week     If you drink alcohol do you typically have >3 drinks per day or >7 drinks per week? No, Per patient 2-3 drinks per week    Last PSA: No results found for: PSA    Reviewed orders with patient. Reviewed health maintenance and updated orders accordingly - Yes  Labs reviewed in EPIC    Reviewed and updated as needed this visit by clinical staff         Reviewed and updated as needed this visit by Provider            Review of Systems  CONSTITUTIONAL: NEGATIVE for fever, chills, change in weight  INTEGUMENTARY/SKIN: NEGATIVE for worrisome rashes,   or lesions. Has spot left shoulder posterior that  might be getting bigger.  Prior AK frozen on face  EYES: NEGATIVE for vision changes or irritation. Due for eye exam  ENT: NEGATIVE for ear, mouth and throat problems  RESP: NEGATIVE for significant cough or SOB  CV:  "NEGATIVE for chest pain, palpitations or peripheral edema  GI: NEGATIVE for nausea, abdominal pain, heartburn, or change in bowel habits   male: negative for dysuria, hematuria, decreased urinary stream, erectile dysfunction, urethral discharge.  History of previous kidney stones.  Urology notes reviewed in the chart.  Currently asymptomatic  MUSCULOSKELETAL:  POSITIVE for \"bulging disc\" in low back through MRI done 10 years ago per pt. No report to review.   L>R radicular sx to the toes with lying down. Seeing chiro  with benefit. No recent back spasms. No LESI.   Did PT for 3-4 mos with improvement in core strength but had flares of back pain twice so hesitant to do PT again. No B/B intontinence. Not having any sx today that limit activity  NEURO: NEGATIVE for weakness, dizziness. See radicular as abobe  PSYCHIATRIC:  POSITIVE for anxiety. BARBARA = 2 with Citalopram. Very rare  breakthrough anxiety flares.  Has used Clonazepam in the past for these and has worked well without side effects. Does not have med at home currently. [place]  reviewed and no concerns. CareEverywhere reviewed and confirmed med used previously also    OBJECTIVE:   /80   Pulse 58   Temp 97.4  F (36.3  C) (Temporal)   Resp 16   Wt 83.5 kg (184 lb)   SpO2 96%   BMI 24.95 kg/m      Physical Exam  General appearance - healthy, alert, no distress. Calm affect currently  Skin -  Mild rosacea erythema on face. Multiple cherry hemangiomas on trunk  Head - normocephalic, atraumatic  Eyes - PRIYANKA, EOMI, fundi exam with nondilated pupils negative.  Ears - External ears normal. Canals clear. TM's normal.  Nose/Sinuses - Nares normal. Septum midline. Mucosa normal. No drainage or sinus tenderness.  Oropharynx - No erythema, no adenopathy, no exudates.  Neck - Supple without adenopathy or thyromegaly. No bruits.  Lungs - Clear to auscultation without wheezes/rhonchi.  Heart - Regular rate and rhythm without murmurs, clicks, or gallops.  Nodes - " No supraclavicular, axillary, or inguinal adenopathy palpable.  Abdomen - Abdomen soft, non-tender. BS normal. No masses or hepatosplenomegaly palpable. No bruits.  Extremities -No cyanosis, clubbing or edema.    Musculoskeletal - Spine ROM normal. Muscular strength intact.   Neg SLRT bilaterally  Peripheral pulses - radial=4/4, femoral=4/4, posterior tibial=4/4, dorsalis pedis=4/4,  Neuro - Gait normal. Reflexes normal and symmetric. Sensation grossly WNL.  Genital - Normal-appearing male external genitalia. No scrotal masses or inguinal hernia palpable.   Rectal - Guaic negative stool. Normal tone. Prostate normal in size to palpation. No rectal masses or prostate nodularity palpable       ASSESSMENT/PLAN:   1. Encounter for routine adult medical exam with abnormal findings   Screening labs as ordered. See below for other HCM  - Comprehensive metabolic panel  - Lipid panel reflex to direct LDL Fasting  - CBC with platelets  - Vitamin D Deficiency    2. Special screening for malignant neoplasms, colon   Due for colonoscopy screening for colon cancer  - GASTROENTEROLOGY ADULT REF PROCEDURE ONLY Other; [facility] [phone]    3. Screening for prostate cancer   PSA ordered for intermittent monitoring after discussion with pt and pt preference. Not doing annual PSA per USPTF recs  - Prostate spec antigen screen    4. Need for prophylactic vaccination with tetanus-diphtheria (Td)  - TD PRESERV FREE, IM (7+ YRS)    5. Anxiety  Controlled overall with Citalopram. Occ breakthrough flares that have responded well in past to Clonazepam. WIll add med for rare prn flares.  See counseling below. Warned re: possible sedation with med  - citalopram (CELEXA) 20 MG tablet; Take 1 tablet (20 mg) by mouth daily  Dispense: 90 tablet; Refill: 3  - clonazePAM (KLONOPIN) 1 MG tablet; Take 1 tablet (1 mg) by mouth 2 times daily as needed for anxiety  Dispense: 20 tablet; Refill: 0  - OFFICE/OUTPT VISIT,NEW,LEVL III    6. Skin disorder    "Appears to be mild rosacea. Pt  Wishes to see derm to discuss treatment. Possible topical Metronizadole trial  - SKIN CARE REFERRAL     7. History of kidney stones   Has been followed by Urology. Calcium Oxalate stones per lab report. Counseled re\": good hydration with water. Will also add some calcium to duet to help bind oxalate in intestines to reduce stone formation risk. Counseled pt re: diet with lower oxalate in addition    COUNSELING:   Reviewed preventive health counseling, as reflected in patient instructions    Estimated body mass index is 24.41 kg/m  as calculated from the following:    Height as of 6/10/19: 1.829 m (6').    Weight as of 6/10/19: 81.6 kg (180 lb).          reports that he has never smoked. He has never used smokeless tobacco.      Counseling Resources:  ATP IV Guidelines  Pooled Cohorts Equation Calculator  FRAX Risk Assessment  ICSI Preventive Guidelines  Dietary Guidelines for Americans, 2010  [employer]'s MyPlate  ASA Prophylaxis  Lung CA Screening      PLAN:   Continue current meds  Prescriptions refilled.    Clonazepam 1 tab twice a day as needed for severe anxiety breakthrough. May cause fatigue. No driving for 3-4 hrs after use  Labs today as ordered  Vaccinations: Tetanus (Td) vaccine  Colonoscopy  with  [facility]. They will call to schedule. If not heard from them in 1 week, then call [phone].   Schedule an eye appointment   Calcium 500mg tab daily for kidney stone protection. Maintain good hydration   I would recommend you receive an influenza (flu) vaccine in the Fall  (October or November)  Counseled pt to discontinue use of Excedrin Tension Headache medication due to risk for rebound headaches with caffeine and can worsen anxiety.   Skin care clinic referral per pt request re: probable rosacea. Pt to call for appt  Pt was informed regarding extra E&M billing for management of new or established medical issues not related to today's wellness visit      Richard YANG " MD [facility]

## 2019-08-27 NOTE — PATIENT INSTRUCTIONS
Continue current meds  Prescriptions refilled.    Clonazepam 1 tab twice a day as needed for severe anxiety breakthrough. May cause fatigue. No driving for 3-4 hrs after use  Labs today as ordered  Vaccinations: Tetanus (Td) vaccine  Colonoscopy  with  [facility]. They will call to schedule. If not heard from them in 1 week, then call [phone].   Schedule an eye appointment   Calcium 500mg tab daily for kidney stone protection. Maintain good hydration   I would recommend you receive an influenza (flu) vaccine in the Fall  (October or November)  Counseled pt to discontinue use of Excedrin Tension Headache medication due to risk for rebound headaches with caffeine and can worsen anxiety.   Skin care clinic referral per pt request re: probable rosacea. Pt to call for appt  Pt was informed regarding extra E&M billing for management of new or established medical issues not related to today's wellness visit

## 2019-08-27 NOTE — LETTER
[facility]  [address] [address]  [phone]      8/28/2019       Krystian Akins  [address]  [place] [address]        Dear Krytsian,  Here are your most recent lab results. Unless commented on below, mild variation of results  outside the normal range are not clinically signicant.    Resulted Orders   Comprehensive metabolic panel   Result Value Ref Range    Sodium 140 133 - 144 mmol/L    Potassium 4.3 3.4 - 5.3 mmol/L    Chloride 106 94 - 109 mmol/L    Carbon Dioxide 29 20 - 32 mmol/L    Anion Gap 5 3 - 14 mmol/L    Glucose 100 (H) 70 - 99 mg/dL    Urea Nitrogen 15 7 - 30 mg/dL    Creatinine 0.92 0.66 - 1.25 mg/dL    GFR Estimate >90 >60 mL/min/[1.73_m2]      Comment:      Non  GFR Calc  Starting 12/18/2018, serum creatinine based estimated GFR (eGFR) will be   calculated using the Chronic Kidney Disease Epidemiology Collaboration   (CKD-EPI) equation.      GFR Estimate If Black >90 >60 mL/min/[1.73_m2]      Comment:       GFR Calc  Starting 12/18/2018, serum creatinine based estimated GFR (eGFR) will be   calculated using the Chronic Kidney Disease Epidemiology Collaboration   (CKD-EPI) equation.      Calcium 9.2 8.5 - 10.1 mg/dL    Bilirubin Total 0.8 0.2 - 1.3 mg/dL    Albumin 4.6 3.4 - 5.0 g/dL    Protein Total 8.0 6.8 - 8.8 g/dL    Alkaline Phosphatase 68 40 - 150 U/L    ALT 57 0 - 70 U/L    AST 28 0 - 45 U/L   Lipid panel reflex to direct LDL Fasting   Result Value Ref Range    Cholesterol 287 (H) <200 mg/dL      Comment:      Desirable:       <200 mg/dl    Triglycerides 132 <150 mg/dL    HDL Cholesterol 51 >39 mg/dL    LDL Cholesterol Calculated 210 (H) <100 mg/dL      Comment:      Above desirable:  100-129 mg/dl  Borderline High:  130-159 mg/dL  High:             160-189 mg/dL  Very high:       >189 mg/dl      Non HDL Cholesterol 236 (H) <130 mg/dL      Comment:      Above Desirable:  130-159 mg/dl  Borderline  high:  160-189 mg/dl  High:             190-219 mg/dl  Very high:       >219 mg/dl     CBC with platelets   Result Value Ref Range    WBC 7.0 4.0 - 11.0 10e9/L    RBC Count 5.16 4.4 - 5.9 10e12/L    Hemoglobin 16.2 13.3 - 17.7 g/dL    Hematocrit 47.4 40.0 - 53.0 %    MCV 92 78 - 100 fl    MCH 31.4 26.5 - 33.0 pg    MCHC 34.2 31.5 - 36.5 g/dL    RDW 12.3 10.0 - 15.0 %    Platelet Count 192 150 - 450 10e9/L   Vitamin D Deficiency   Result Value Ref Range    Vitamin D Deficiency screening 20 20 - 75 ug/L      Comment:      Season, race, dietary intake, and treatment affect the concentration of   25-hydroxy-Vitamin D. Values may decrease during winter months and increase   during summer months. Values 20-29 ug/L may indicate Vitamin D insufficiency   and values <20 ug/L may indicate Vitamin D deficiency.  Vitamin D determination is routinely performed by an immunoassay specific for   25 hydroxyvitamin D3.  If an individual is on vitamin D2 (ergocalciferol)   supplementation, please specify 25 OH vitamin D2 and D3 level determination by   LCMSMS test [id number].     Prostate spec antigen screen   Result Value Ref Range    PSA 1.96 0 - 4 ug/L      Comment:      Assay Method:  Chemiluminescence using Siemens Vista analyzer       HDL (good) Cholesterol, Triglycerides, Electrolyte, Hemoglobin, Kidney function, Liver, Platelets, Prostate cancer screening, Vitamin D and White Blood Cells lab results were normal.  Glucose/blood sugar lab result was only 1 point above normal and not clinically significant  Total Cholesterol, LDL (bad) Cholesterol and Non-HDL (bad) cholesterol lab results were abnormal.  Abnormalities of the cholesterol lab values increase the risk for heart and other vascular disease. With your bad LDL cholesterol > 190, that risk is great enough that use of statin cholesterol-lowering medication is indicated.   However, since your cholesterol numbers were previously better in 2013, I would like to first see if 6  "months of improved lifestyle modification can lower the cholesterol values with a plan to then recheck a cholesterol panel and recalculate your risk profile based on those future values.  Reduce saturated fats (red meats, fried and processed foods) in your diet and increase the amount of color on your plate with fruits and vegetables.  Continue frequency of walking or other exercise  Call our appointment desk at [phone] or use [employer] to schedule a \"lab only\" to have your Lipid profile (Cholesterol Panel) checked fasting in 6 months.  For fasting labs, please refrain from eating for 8 hours or more.  Be sure to  drink water and take your  medications the day of the test.  If the cholesterol remains elevated at that time, I would then recommend starting cholesterol-lowering medication.    If you have further questions/concerns regarding the results, I would ask that you bring them to your next follow-up appointment with me and I would be happy to review them with you further.      Sincerely,      Richard Purcell MD  Internal Medicine      "

## 2019-08-27 NOTE — PROCEDURES
Screening Questionnaire for Adult Immunization    Are you sick today?   No   Do you have allergies to medications, food, a vaccine component or latex?   No   Have you ever had a serious reaction after receiving a vaccination?   No   Do you have a long-term health problem with heart disease, lung disease, asthma, kidney disease, metabolic disease (e.g. diabetes), anemia, or other blood disorder?   No   Do you have cancer, leukemia, HIV/AIDS, or any other immune system problem?   No   In the past 3 months, have you taken medications that affect  your immune system, such as prednisone, other steroids, or anticancer drugs; drugs for the treatment of rheumatoid arthritis, Crohn s disease, or psoriasis; or have you had radiation treatments?   No   Have you had a seizure, or a brain or other nervous system problem?   No   During the past year, have you received a transfusion of blood or blood     products, or been given immune (gamma) globulin or antiviral drug?   No   For women: Are you pregnant or is there a chance you could become        pregnant during the next month?   No   Have you received any vaccinations in the past 4 weeks?   No     Immunization questionnaire answers were all negative.        Per orders of Dr. Purcell, injection of Td given by Malu Jordan CMA. Patient instructed to remain in clinic for 15 minutes afterwards, and to report any adverse reaction to me immediately.       Screening performed by Malu Jordan CMA on 8/27/2019 at 10:29 AM.

## 2019-08-28 ASSESSMENT — ANXIETY QUESTIONNAIRES: GAD7 TOTAL SCORE: 2

## 2019-09-01 PROBLEM — N20.0 NEPHROLITHIASIS: Status: RESOLVED | Noted: 2019-06-10 | Resolved: 2019-09-01

## 2019-09-01 PROBLEM — Z87.442 HISTORY OF KIDNEY STONES: Status: ACTIVE | Noted: 2019-09-01

## 2020-08-16 DIAGNOSIS — F41.9 ANXIETY: ICD-10-CM

## 2020-08-18 RX ORDER — CITALOPRAM HYDROBROMIDE 20 MG/1
TABLET ORAL
Qty: 90 TABLET | Refills: 0 | Status: SHIPPED | OUTPATIENT
Start: 2020-08-18 | End: 2020-11-13

## 2020-10-19 ENCOUNTER — TRANSFERRED RECORDS (OUTPATIENT)
Dept: HEALTH INFORMATION MANAGEMENT | Facility: CLINIC | Age: 60
End: 2020-10-19
Payer: COMMERCIAL

## 2020-11-13 DIAGNOSIS — F41.9 ANXIETY: ICD-10-CM

## 2020-11-13 RX ORDER — CITALOPRAM HYDROBROMIDE 20 MG/1
TABLET ORAL
Qty: 30 TABLET | Refills: 0 | Status: SHIPPED | OUTPATIENT
Start: 2020-11-13 | End: 2020-12-17

## 2020-11-13 NOTE — TELEPHONE ENCOUNTER
It has been over a year since patient was seen.  Also overdue for fasting lipids with history of previous hypercholesterolemia.  Citalopram refilled for 30 days.  Patient to schedule a fasting lab appointment in the next couple weeks and then have an appointment with me a few days later to review lab results and follow-up on mental health.  Okay for virtual visit.  Assist patient with scheduling fasting lab appointment and virtual follow-up visit with me.  Will address more long-term refills of medication after appointments as appropriate

## 2020-11-13 NOTE — TELEPHONE ENCOUNTER
Citalopram    Routing refill request to provider for review/approval because:  Kadie given x1 and patient did not follow up, please advise  Patient needs to be seen because it has been more than 1 year since last office visit.    Kate CARROLLN, RN, PHN

## 2020-11-13 NOTE — LETTER
[facility]  [address] [place] [address]  [phone]      12/1/2020       Krystian Akins  [address]  [place] [address]        Dear Krystian,  While refilling your prescription today, we noticed that you are due for a follow up appointment and fasting labs with Dr. Purcell. We will refill your Citalopram prescription for 30 days. Please call to schedule to a fasting lab appointment along with a separate follow up appointment with Dr. Purcell a few days after labs are complete to review those results and address other medical issues as needed    Taking care of your health is important to us and we look forward to seeing you in the near future.  Please call us at [phone] or [phone] (or use [employer]) to schedule an appointment.     Please disregard this notice if you have already made an appointment.        Sincerely,    Richard Purcell MD/Malu Jordan, [facility]  Internal Medicine

## 2020-12-17 DIAGNOSIS — F41.9 ANXIETY: ICD-10-CM

## 2020-12-17 RX ORDER — CITALOPRAM HYDROBROMIDE 20 MG/1
TABLET ORAL
Qty: 30 TABLET | Refills: 0 | Status: SHIPPED | OUTPATIENT
Start: 2020-12-17 | End: 2021-08-26

## 2020-12-17 NOTE — TELEPHONE ENCOUNTER
"Requested Prescriptions   Pending Prescriptions Disp Refills     citalopram (CELEXA) 20 MG tablet [Pharmacy Med Name: CITALOPRAM HBR 20 MG TABLET] 30 tablet 0     Sig: TAKE 1 TABLET BY MOUTH EVERY DAY       SSRIs Protocol Failed - 12/17/2020  3:47 AM        Failed - Recent (12 mo) or future (30 days) visit within the authorizing provider's specialty     Patient has had an office visit with the authorizing provider or a provider within the authorizing providers department within the previous 12 mos or has a future within next 30 days. See \"Patient Info\" tab in inbasket, or \"Choose Columns\" in Meds & Orders section of the refill encounter.              Passed - Medication is active on med list        Passed - Patient is age 18 or older             "

## 2020-12-17 NOTE — TELEPHONE ENCOUNTER
Pt  was seen once by me 8/27/19.  Not since then. Needs follow-up. RF done for 30 days. Pt to schedule clinic or virtual visit with me in the next 30 days to review mental health status. Assist pt with scheduling appt

## 2020-12-28 NOTE — TELEPHONE ENCOUNTER
VALERIO for patient to call back and schedule a follow up appointment with her provider.    Val Valencia MA

## 2021-01-13 DIAGNOSIS — F41.9 ANXIETY: ICD-10-CM

## 2021-01-15 NOTE — TELEPHONE ENCOUNTER
Routing refill request to provider for review/approval because:  Kadie given x1 and patient did not follow up, please advise  Patient needs to be seen because it has been more than 1.5 year since last office visit.

## 2021-01-16 NOTE — TELEPHONE ENCOUNTER
Patient was sent a letter in November instructing him of need to follow-up in clinic.  Was given 30-day refill.  Then requested refill again 1 month ago and message was left for patient to schedule an appointment and he was given a 30-day prescription.  No appointment still scheduled by patient.  Call patient and get an appointment scheduled with me in the next 30 days.  After that appointment is scheduled, may then route refill request back to me to address and will then fill the medication to cover until patient is seen back in clinic.  I will not refill this medication again until patient has appointment scheduled and will not fill medication again after that in the future until patient is physically seen back in clinic

## 2021-01-29 LAB
CHOLEST SERPL-MCNC: 295 MG/DL
HDLC SERPL-MCNC: 46 MG/DL
LDLC SERPL CALC-MCNC: 215 MG/DL
NONHDLC SERPL-MCNC: 249 MG/DL
TRIGL SERPL-MCNC: 168 MG/DL

## 2021-01-29 PROCEDURE — 80061 LIPID PANEL: CPT | Performed by: INTERNAL MEDICINE

## 2021-01-29 PROCEDURE — 36415 COLL VENOUS BLD VENIPUNCTURE: CPT | Performed by: INTERNAL MEDICINE

## 2021-01-30 NOTE — RESULT ENCOUNTER NOTE
Results reviewed. Will discuss them soon with pt at upcoming scheduled appt. See clinic note from that visit for future plan 2/5/21

## 2021-02-05 ENCOUNTER — OFFICE VISIT (OUTPATIENT)
Dept: INTERNAL MEDICINE | Facility: CLINIC | Age: 61
End: 2021-02-05
Payer: COMMERCIAL

## 2021-02-05 VITALS
WEIGHT: 183 LBS | RESPIRATION RATE: 16 BRPM | HEART RATE: 80 BPM | TEMPERATURE: 98.5 F | BODY MASS INDEX: 24.79 KG/M2 | SYSTOLIC BLOOD PRESSURE: 120 MMHG | HEIGHT: 72 IN | DIASTOLIC BLOOD PRESSURE: 78 MMHG | OXYGEN SATURATION: 93 %

## 2021-02-05 DIAGNOSIS — E78.5 HYPERLIPIDEMIA LDL GOAL <100: ICD-10-CM

## 2021-02-05 DIAGNOSIS — L98.9 SKIN DISORDER: ICD-10-CM

## 2021-02-05 DIAGNOSIS — F41.9 ANXIETY: Primary | ICD-10-CM

## 2021-02-05 DIAGNOSIS — Z12.11 SPECIAL SCREENING FOR MALIGNANT NEOPLASMS, COLON: ICD-10-CM

## 2021-02-05 PROCEDURE — 99214 OFFICE O/P EST MOD 30 MIN: CPT | Performed by: INTERNAL MEDICINE

## 2021-02-05 RX ORDER — CITALOPRAM HYDROBROMIDE 10 MG/1
TABLET ORAL
Qty: 30 TABLET | Refills: 0 | Status: SHIPPED | OUTPATIENT
Start: 2021-02-05 | End: 2021-08-26

## 2021-02-05 ASSESSMENT — ANXIETY QUESTIONNAIRES
6. BECOMING EASILY ANNOYED OR IRRITABLE: NOT AT ALL
2. NOT BEING ABLE TO STOP OR CONTROL WORRYING: NOT AT ALL
5. BEING SO RESTLESS THAT IT IS HARD TO SIT STILL: NOT AT ALL
3. WORRYING TOO MUCH ABOUT DIFFERENT THINGS: NOT AT ALL
IF YOU CHECKED OFF ANY PROBLEMS ON THIS QUESTIONNAIRE, HOW DIFFICULT HAVE THESE PROBLEMS MADE IT FOR YOU TO DO YOUR WORK, TAKE CARE OF THINGS AT HOME, OR GET ALONG WITH OTHER PEOPLE: NOT DIFFICULT AT ALL
7. FEELING AFRAID AS IF SOMETHING AWFUL MIGHT HAPPEN: NOT AT ALL
GAD7 TOTAL SCORE: 0
1. FEELING NERVOUS, ANXIOUS, OR ON EDGE: NOT AT ALL

## 2021-02-05 ASSESSMENT — MIFFLIN-ST. JEOR: SCORE: 1683.08

## 2021-02-05 ASSESSMENT — PATIENT HEALTH QUESTIONNAIRE - PHQ9: 5. POOR APPETITE OR OVEREATING: NOT AT ALL

## 2021-02-05 NOTE — PROGRESS NOTES
Assessment & Plan     ASSESSMENT:    1. Anxiety  Well-controlled.  Will wean off of citalopram as below  - citalopram (CELEXA) 10 MG tablet; 1/2-1 tab daily by mouth  Dispense: 30 tablet; Refill: 0    2. Hyperlipidemia LDL goal <100  Elevated CAD risk per patient hesitant to start statin.  Will get CT coronary calcium scan and then discuss further with patient based on results  - CT Coronary Calcium Scan; Future    3. Skin disorder  Recent skin changes since wearing a facemask often.  Patient to see dermatology for further evaluation/treatment  - DERMATOLOGY ADULT REFERRAL; Future    4. Special screening for malignant neoplasms, colon  Overdue for colon cancer screening.  Denies blood in stools at this time  - GASTROENTEROLOGY ADULT REF PROCEDURE ONLY; Future      PLAN:   Reduce Citalopram to 10mg daily for a couple weeks. If mood remains stable, then reduce further to 5mg daily for 2 weeks and if stable, then stop Citalopram   Inform MD if recurrent anxiety issues or not able to wean off of med   CT coronary calcium scan. ARY Raza will call to schedule or you may call [phone].   Consider statin therapy. Will  await results  of CT scan   Would recommend you receive a coronavirus/COVID-19 vaccination when it becomes available to those in your age/risk category  Screening colonoscopy  with  [facility]. Call  them at [phone] to schedule the procedure.    Referral to [facility] ([facility] 3rd floor)  re: facial skin changes. Call for appt if you do not hear from [facility]   in next 2 business days        (Chart documentation was completed, in part, with [employer] voice-recognition software. Even though reviewed, some grammatical, spelling, and word errors may remain.)    Richard Purcell MD  Internal Medicine Department  [facility] DIONI Boland is a 60 year old who presents to clinic today for the following health issues   accompanied by himself:    HPI   Chief Complaint   Patient presents with     Follow Up     for Lab Results completed on 01/29/2021      Most recent lab results reviewed with pt.         Component      Latest Ref Rng & Units 8/27/2019 1/29/2021   Cholesterol      <200 mg/dL 287 (H) 295 (H)   Triglycerides      <150 mg/dL 132 168 (H)   HDL Cholesterol      >39 mg/dL 51 46   LDL Cholesterol Calculated      <100 mg/dL 210 (H) 215 (H)   Non HDL Cholesterol      <130 mg/dL 236 (H) 249 (H)   PSA      0 - 4 ug/L 1.96           No identified additional risks  The 10-year ASCVD risk score (William GODINEZ Jr., et al., 2013) is: 10.9%    Values used to calculate the score:      Age: 60 years      Sex: Male      Is Non- : No      Diabetic: No      Tobacco smoker: No      Systolic Blood Pressure: 120 mmHg      Is BP treated: No      HDL Cholesterol: 46 mg/dL      Total Cholesterol: 295 mg/dL      History of elevated lipids as above.  Unchanged previous despite following low-cholesterol diet and exercising frequently.  10-year CAD risk above that were statin will be recommended.  However patient is hesitant to use statin at this time.  Is interested in having a CT calcium coronary scan done to assess the coronary disease present at this time as a way to influence whether statin should be used  Anxiety well controlled and wishes to wean off of SSRI.  Patient states anxiety was related to his previous job interview state which he has discontinued it is now retired.  Denies depression. BARBARA = 0  Has intermittent erythematous acneiform-like spots on face around nose and facial cheeks.  Would like to see dermatology.  Is noticing more commonly since wearing facemask with Covid pandemic    additional ROS:   Constitutional, HEENT, Cardiovascular, Pulmonary, GI and , Neuro, MSK and Psych review of systems/symptoms are otherwise negative or unchanged from previous, except as noted above.      OBJECTIVE:  /78    Pulse 80   Temp 98.5  F (36.9  C) (Temporal)   Resp 16   Ht 1.829 m (6')   Wt 83 kg (183 lb)   SpO2 93%   BMI 24.82 kg/m     Estimated body mass index is 24.82 kg/m  as calculated from the following:    Height as of this encounter: 1.829 m (6').    Weight as of this encounter: 83 kg (183 lb).     Neck: no adenopathy. Thyroid normal to palpation. No bruits  Pulm: Lungs clear to auscultation   CV: Regular rates and rhythm  GI: Soft, nontender, Normal active bowel sounds, No hepatosplenomegaly or masses palpable  Ext: Peripheral pulses intact. No edema.  Gen: Normal affect   Skin:  Few small acneiform-like papules on nose and face

## 2021-02-05 NOTE — PATIENT INSTRUCTIONS
Reduce Citalopram to 10mg daily for a couple weeks. If mood remains stable, then reduce further to 5mg daily for 2 weeks and if stable, then stop Citalopram   Inform MD if recurrent anxiety issues or not able to wean off of med   CT coronary calcium scan. ARY Raza will call to schedule or you may call [phone].   Consider statin therapy. Will  await results  of CT scan   Would recommend you receive a coronavirus/COVID-19 vaccination when it becomes available to those in your age/risk category  Screening colonoscopy  with  [facility]. Call  them at [phone] to schedule the procedure.    Referral to [facility] ([facility] 3rd floor)  re: facial skin changes. Call for appt if you do not hear from [facility]   in next 2 business days

## 2021-02-06 ASSESSMENT — ANXIETY QUESTIONNAIRES: GAD7 TOTAL SCORE: 0

## 2021-02-11 RX ORDER — CITALOPRAM HYDROBROMIDE 20 MG/1
TABLET ORAL
Qty: 30 TABLET | Refills: 0 | OUTPATIENT
Start: 2021-02-11

## 2021-03-02 ENCOUNTER — TRANSFERRED RECORDS (OUTPATIENT)
Dept: HEALTH INFORMATION MANAGEMENT | Facility: CLINIC | Age: 61
End: 2021-03-02

## 2021-03-10 ENCOUNTER — OFFICE VISIT (OUTPATIENT)
Dept: DERMATOLOGY | Facility: CLINIC | Age: 61
End: 2021-03-10
Payer: COMMERCIAL

## 2021-03-10 VITALS — HEART RATE: 70 BPM | SYSTOLIC BLOOD PRESSURE: 128 MMHG | OXYGEN SATURATION: 96 % | DIASTOLIC BLOOD PRESSURE: 82 MMHG

## 2021-03-10 DIAGNOSIS — Z80.8 FAMILY HISTORY OF NONMELANOMA SKIN CANCER: ICD-10-CM

## 2021-03-10 DIAGNOSIS — L82.1 SEBORRHEIC KERATOSES: ICD-10-CM

## 2021-03-10 DIAGNOSIS — L57.8 SOLAR ELASTOSIS: ICD-10-CM

## 2021-03-10 DIAGNOSIS — D22.9 ATYPICAL NEVUS: ICD-10-CM

## 2021-03-10 DIAGNOSIS — L98.9 SKIN DISORDER: ICD-10-CM

## 2021-03-10 DIAGNOSIS — D22.9 MULTIPLE BENIGN NEVI: ICD-10-CM

## 2021-03-10 DIAGNOSIS — L81.4 LENTIGINES: ICD-10-CM

## 2021-03-10 DIAGNOSIS — Z80.8 FAMILY HX OF MELANOMA: ICD-10-CM

## 2021-03-10 DIAGNOSIS — L57.0 ACTINIC KERATOSES: Primary | ICD-10-CM

## 2021-03-10 DIAGNOSIS — L82.0 INFLAMED SEBORRHEIC KERATOSIS: ICD-10-CM

## 2021-03-10 PROCEDURE — 99203 OFFICE O/P NEW LOW 30 MIN: CPT | Mod: 25 | Performed by: PHYSICIAN ASSISTANT

## 2021-03-10 PROCEDURE — 17110 DESTRUCTION B9 LES UP TO 14: CPT | Performed by: PHYSICIAN ASSISTANT

## 2021-03-10 PROCEDURE — 17003 DESTRUCT PREMALG LES 2-14: CPT | Mod: 59 | Performed by: PHYSICIAN ASSISTANT

## 2021-03-10 PROCEDURE — 17000 DESTRUCT PREMALG LESION: CPT | Mod: 59 | Performed by: PHYSICIAN ASSISTANT

## 2021-03-10 NOTE — PATIENT INSTRUCTIONS
Proper skin care from [facility]:    -Eliminate harsh soaps as they strip the natural oils from the skin, often resulting in dry itchy skin ( i.e. Dial, Zest, Brandie Spring)  -Use mild soaps such as Cetaphil or Dove Sensitive Skin in the shower. You do not need to use soap on arms, legs, and trunk every time you shower unless visibly soiled.   -Avoid hot or cold showers.  -After showering, lightly dry off and apply moisturizing within 2-3 minutes. This will help trap moisture in the skin.   -Aggressive use of a moisturizer at least 1-2 times a day to the entire body (including -Vanicream, Cetaphil, Aquaphor or Cerave) and moisturize hands after every washing.  -We recommend using moisturizers that come in a tub that needs to be scooped out, not a pump. This has more of an oil base. It will hold moisture in your skin much better than a water base moisturizer. The above recommended are non-pore clogging.      Wear a sunscreen with at least SPF 30 on your face, ears, neck and V of the chest daily. Wear sunscreen on other areas of the body if those areas are exposed to the sun throughout the day. Sunscreens can contain physical and/or chemical blockers. Physical blockers are less likely to clog pores, these include zinc oxide and titanium dioxide. Reapply every two hour and after swimming. Sunscreen examples include Neutrogena, CeraVe, Blue Lizard, Elta MD and many others.    UV radiation  UVA radiation remains constant throughout the day and throughout the year. It is a longer wavelength than UVB and therefore penetrates deeper into the skin leading to immediate and delayed tanning, photoaging, and skin cancer. 70-80% of UVA and UVB radiation occurs between the hours of 10am-2pm.  UVB radiation  UVB radiation causes the most harmful effects and is more significant during the summer months. However, snow and ice can reflect UVB radiation leading to skin damage during the winter months as well. UVB radiation is  responsible for tanning, burning, inflammation, delayed erythema (pinkness), pigmentation (brown spots), and skin cancer.     I recommend self monthly full body exams and yearly full body exams with a dermatology provider. If you develop a new or changing lesion please follow up for examination. Most skin cancers are pink and scaly or pink and pearly. However, we do see blue/brown/black skin cancers.  Consider the ABCDEs of melanoma when giving yourself your monthly full body exam ( don't forget the groin, buttocks, feet, toes, etc). A-asymmetry, B-borders, C-color, D-diameter, E-elevation or evolving. If you see any of these changes please follow up in clinic. If you cannot see your back I recommend purchasing a hand held mirror to use with a larger wall mirror.        WOUND CARE INSTRUCTIONS  FOR CRYOSURGERY        This area treated with liquid nitrogen will form a blister. You do not need to bandage the area until after the blister forms and breaks (which may be a few days).  When the blister breaks, begin daily dressing changes as follows:    1) Clean and dry the area with tap water using clean Q-tip or sterile gauze pad.    2) Apply Aquaphor, Vaseline, Polysporin ointment or Bacitracin ointment over entire wound.  Do NOT use Neosporin ointment.    3) Cover the wound with a band-aid or sterile non-stick gauze pad and micropore paper tape.      REPEAT THESE INSTRUCTIONS AT LEAST ONCE A DAY UNTIL THE WOUND HAS COMPLETELY HEALED.        It is an old wives tale that a wound heals better when it is exposed to air and allowed to dry out. The wound will heal faster with a better cosmetic result if it is kept moist with ointment and covered with a bandage.  Do not let the wound dry out.      Supplies Needed:     *Cotton tipped applicators (Q-tips)   *Aquaphor, Vaseline, Polysporin ointment or Bacitracin ointment (NOT NEOSPORIN)   *Band-aids, or non stick gauze pads and micropore paper tape    PATIENT  INFORMATION    During the healing process you will notice a number of changes. All wounds develop a small halo of redness surrounding the wound.  This means healing is occurring. Severe itching with extensive redness usually indicates sensitivity to the ointment or bandage tape used to dress the wound.  You should call our office if this develops.      Swelling and/or discoloration around your surgical site is common, particularly when performed around the eye.    All wounds normally drain.  The larger the wound the more drainage there will be.  After 7-10 days, you will notice the wound beginning to shrink and new skin will begin to grow.  The wound is healed when you can see skin has formed over the entire area.  A healed wound has a healthy, shiny look to the surface and is red to dark pink in color to normalize.  Wounds may take approximately 4-6 weeks to heal.  Larger wounds may take 6-8 weeks.  After the wound is healed you may discontinue dressing changes.    You may experience a sensation of tightness as your wound heals. This is normal and will gradually subside.    Your healed wound may be sensitive to temperature changes. This sensitivity improves with time, but if you re having a lot of discomfort, try to avoid temperature extremes.    Patients frequently experience itching after their wound appears to have healed because of the continue healing under the skin.  Plain Vaseline will help relieve the itching.

## 2021-03-10 NOTE — LETTER
3/10/2021         RE: Krystian Akins  [address]  [place] [address]        Dear Colleague,    Thank you for referring your patient, Krystian Akins, to the [facility]. Please see a copy of my visit note below.    HPI:  Krystian Akins is a 60 year old male patient here today for spots on face .  Patient states this has been present for a while.  Patient reports the following symptoms: scaly .  Patient reports the following previous treatments: ln2 to two of the lesions with improvement but has since recurred.  Patient reports the following modifying factors: none.  Associated symptoms: none.  Patient has no other skin complaints today.  Remainder of the HPI, Meds, PMH, Allergies, FH, and SH was reviewed in chart.    Pertinent Hx:   No personal history of skin cancer. Had a mole removed. Unsure of level of dysplasia. Father had NMSC. Maternal uncle had MM.     Past Medical History:   Diagnosis Date     Arrhythmia     no s/s     Gastric ulcer 2015     Generalized anxiety disorder      GI bleed      HLD (hyperlipidemia)      Hyperlipidemia LDL goal <100      Migraine      Nephrolithiasis        Past Surgical History:   Procedure Laterality Date     AS ESOPHAGOSCOPY, DIAGNOSTIC       COMBINED CYSTOSCOPY, RETROGRADES, URETEROSCOPY, INSERT STENT Left 5/16/2019    Procedure: 1. Cystourethroscopy 2. Left ureteroscopy with laser stand-by 3. Left retrograde pyelography with interpretation of intraoperative fluoroscopic imaging 4. Left ureteral stent placement  ;  Surgeon: Enrique Parker MD;  Location: [facility] OR     COMBINED CYSTOSCOPY, RETROGRADES, URETEROSCOPY, LASER HOLMIUM LITHOTRIPSY URETER(S), INSERT STENT Left 5/30/2019    Procedure: 1. Cystourethroscopy 2. Left ureteroscopy with holmium laser lithotripsy and stone extraction 3. Left retrograde pyelography with interpretation of intraoperative fluoroscopic imaging 4. Left ureteral stent exchange;  Surgeon:  Enrique Parker MD;  Location: [facility] OR     CYSTOSCOPY       VASECTOMY          Family History   Problem Relation Age of Onset     Breast Cancer Mother      Cerebrovascular Disease Father      Coronary Artery Disease Father      Hyperlipidemia Father      Skin Cancer Father      Melanoma Maternal Uncle        Social History     Socioeconomic History     Marital status:      Spouse name: Not on file     Number of children: Not on file     Years of education: Not on file     Highest education level: Not on file   Occupational History     Not on file   Social Needs     Financial resource strain: Not on file     Food insecurity     Worry: Not on file     Inability: Not on file     Transportation needs     Medical: Not on file     Non-medical: Not on file   Tobacco Use     Smoking status: Never Smoker     Smokeless tobacco: Never Used   Substance and Sexual Activity     Alcohol use: Yes     Frequency: 2-3 times a week     Comment: 2 or 3 drinks a week     Drug use: Never     Sexual activity: Not on file   Lifestyle     Physical activity     Days per week: Not on file     Minutes per session: Not on file     Stress: Not on file   Relationships     Social connections     Talks on phone: Not on file     Gets together: Not on file     Attends Christianity service: Not on file     Active member of club or organization: Not on file     Attends meetings of clubs or organizations: Not on file     Relationship status: Not on file     Intimate partner violence     Fear of current or ex partner: Not on file     Emotionally abused: Not on file     Physically abused: Not on file     Forced sexual activity: Not on file   Other Topics Concern     Parent/sibling w/ CABG, MI or angioplasty before 65F 55M? Not Asked   Social History Narrative     Not on file       Outpatient Encounter Medications as of 3/10/2021   Medication Sig Dispense Refill     acetaminophen (TYLENOL) 325 MG tablet Take 325-650 mg by mouth every 6 hours  as needed for mild pain       citalopram (CELEXA) 10 MG tablet 1/2-1 tab daily by mouth (Patient not taking: Reported on 3/10/2021) 30 tablet 0     citalopram (CELEXA) 20 MG tablet TAKE 1 TABLET BY MOUTH EVERY DAY (Patient not taking: Reported on 3/10/2021) 30 tablet 0     No facility-administered encounter medications on file as of 3/10/2021.        Review Of Systems:  Skin: spots on face  Eyes: negative  Ears/Nose/Throat: negative  Respiratory: No shortness of breath, dyspnea on exertion, cough, or hemoptysis  Cardiovascular: negative  Gastrointestinal: negative  Genitourinary: negative  Musculoskeletal: negative  Neurologic: negative  Psychiatric: negative  Hematologic/Lymphatic/Immunologic: negative  Endocrine: negative      Objective:     /82   Pulse 70   SpO2 96%   Eyes: Conjunctivae/lids: Normal   ENT: Lips:  Normal  MSK: Normal  Cardiovascular: Peripheral edema none  Pulm: Breathing Normal  Neuro/Psych: Orientation: A/O x 3. Normal; Mood/Affect: Normal, NAD, WDWN  Pt accompanied by: self  Following areas examined: Scalp, face, eyelids, lips, neck, chest, abdomen, back, and R&L upper and lower extremities. Pt defers exam of buttock, hips, groin and genitals.   Huertas skin type:ii   Nodes: inguinal and axillary NLAD  Findings:  Brown, stuck-on scaly appearing papules on trunk and extremities.  Well circumscribed macules with symmetric color distribution on trunk and extremities.  Tan WD smooth macules on face, neck, trunk, and extremities.  Pink gritty macule/s x 6 on left medial orbital rim, left cheek, right cheek, left lateral upper cutaneous lip  Rhytides, hypo/hyperpigmentation, and atrophy  Inflamed brown, stuck-on scaly appearing papules on right temple x 1  Linear depigmented patch on right upper abdomen    Assessment and Plan:     1) Seborrheic keratoses, Benign nevi, Lentigines     I discussed the specifics of tumor, prognosis, and genetics of benign lesions.  I explained that treatment  of these lesions would be purely cosmetic and not medically neccessary.  I discussed with patient different removal options including excision, cryotherapy, cautery and /or laser.  Lesion may recur and/or may not completely resolve. May need additional treatment.     2) ISK x 1  Benign etiology and course of lesion.  LN2: Treated with LN2 for 5s for 1-2 cycles. Warned risks of blistering, pain, pigment change, scarring, and incomplete resolution.  Advised patient to return if lesions do not completely resolve within 2-3 months.  Wound care sheet given.  3)Actinic keratoses x 6 and solar elastosis    LN2 for 5 seconds x 2. Discussed AE include hypopigmentation (white spot) and recurrence. Follow up in 2-3 months to recheck lesions. There is a risk of AKs developing into a SCC.   Treatment options include LN2 vs PDT vs Efudex. Pt elected LN2    4) personal history of atypical nevus, family history of NMSC and MM  Pt to sign a MARY ANNE  Signs and Symptoms of non-melanoma skin cancer and ABCDEs of melanoma reviewed with patient. Patient encouraged to perform monthly self skin exams and educated on how to perform them. UV precautions reviewed with patient. Patient was asked about new or changing moles/lesions on body.   Wear a sunscreen with at least SPF 30 on your face, ears, neck and V of the chest daily. Wear sunscreen on other areas of the body if those areas are exposed to the sun throughout the day. Sunscreens can contain physical and/or chemical blockers. Physical blockers are less likely to clog pores, these include zinc oxide and titanium dioxide. Reapply every two hour and after swimming. Sunscreen examples include Neutrogena, CeraVe, Blue Lizard, Elta MD and many others.    Proper skin care from [facility]:    -Eliminate harsh soaps as they strip the natural oils from the skin, often resulting in dry itchy skin ( i.e. Dial, Zest, [place] Spring)  -Use mild soaps such as Cetaphil or Dove Sensitive Skin in  the shower. You do not need to use soap on arms, legs, and trunk every time you shower unless visibly soiled.   -Avoid hot or cold showers.  -After showering, lightly dry off and apply moisturizing within 2-3 minutes. This will help trap moisture in the skin.   -Aggressive use of a moisturizer at least 1-2 times a day to the entire body (including -Vanicream, Cetaphil, Aquaphor or Cerave) and moisturize hands after every washing.  -We recommend using moisturizers that come in a tub that needs to be scooped out, not a pump. This has more of an oil base. It will hold moisture in your skin much better than a water base moisturizer. The above recommended are non-pore clogging.         It was a pleasure speaking with Krystian Akins today.       Follow up in yearly FBE        Again, thank you for allowing me to participate in the care of your patient.        Sincerely,        Kemi Pulido PA-C

## 2021-03-10 NOTE — PROGRESS NOTES
HPI:  Krystian Akins is a 60 year old male patient here today for spots on face .  Patient states this has been present for a while.  Patient reports the following symptoms: scaly .  Patient reports the following previous treatments: ln2 to two of the lesions with improvement but has since recurred.  Patient reports the following modifying factors: none.  Associated symptoms: none.  Patient has no other skin complaints today.  Remainder of the HPI, Meds, PMH, Allergies, FH, and SH was reviewed in chart.    Pertinent Hx:   No personal history of skin cancer. Had a mole removed. Unsure of level of dysplasia. Father had NMSC. Maternal uncle had MM.     Past Medical History:   Diagnosis Date     Arrhythmia     no s/s     Gastric ulcer 2015     Generalized anxiety disorder      GI bleed      HLD (hyperlipidemia)      Hyperlipidemia LDL goal <100      Migraine      Nephrolithiasis        Past Surgical History:   Procedure Laterality Date     AS ESOPHAGOSCOPY, DIAGNOSTIC       COMBINED CYSTOSCOPY, RETROGRADES, URETEROSCOPY, INSERT STENT Left 5/16/2019    Procedure: 1. Cystourethroscopy 2. Left ureteroscopy with laser stand-by 3. Left retrograde pyelography with interpretation of intraoperative fluoroscopic imaging 4. Left ureteral stent placement  ;  Surgeon: Enrique Parker MD;  Location: [facility] OR     COMBINED CYSTOSCOPY, RETROGRADES, URETEROSCOPY, LASER HOLMIUM LITHOTRIPSY URETER(S), INSERT STENT Left 5/30/2019    Procedure: 1. Cystourethroscopy 2. Left ureteroscopy with holmium laser lithotripsy and stone extraction 3. Left retrograde pyelography with interpretation of intraoperative fluoroscopic imaging 4. Left ureteral stent exchange;  Surgeon: Enrique Parker MD;  Location: [facility] OR     CYSTOSCOPY       VASECTOMY          Family History   Problem Relation Age of Onset     Breast Cancer Mother      Cerebrovascular Disease Father      Coronary Artery Disease Father      Hyperlipidemia Father       Skin Cancer Father      Melanoma Maternal Uncle        Social History     Socioeconomic History     Marital status:      Spouse name: Not on file     Number of children: Not on file     Years of education: Not on file     Highest education level: Not on file   Occupational History     Not on file   Social Needs     Financial resource strain: Not on file     Food insecurity     Worry: Not on file     Inability: Not on file     Transportation needs     Medical: Not on file     Non-medical: Not on file   Tobacco Use     Smoking status: Never Smoker     Smokeless tobacco: Never Used   Substance and Sexual Activity     Alcohol use: Yes     Frequency: 2-3 times a week     Comment: 2 or 3 drinks a week     Drug use: Never     Sexual activity: Not on file   Lifestyle     Physical activity     Days per week: Not on file     Minutes per session: Not on file     Stress: Not on file   Relationships     Social connections     Talks on phone: Not on file     Gets together: Not on file     Attends Zoroastrianism service: Not on file     Active member of club or organization: Not on file     Attends meetings of clubs or organizations: Not on file     Relationship status: Not on file     Intimate partner violence     Fear of current or ex partner: Not on file     Emotionally abused: Not on file     Physically abused: Not on file     Forced sexual activity: Not on file   Other Topics Concern     Parent/sibling w/ CABG, MI or angioplasty before 65F 55M? Not Asked   Social History Narrative     Not on file       Outpatient Encounter Medications as of 3/10/2021   Medication Sig Dispense Refill     acetaminophen (TYLENOL) 325 MG tablet Take 325-650 mg by mouth every 6 hours as needed for mild pain       citalopram (CELEXA) 10 MG tablet 1/2-1 tab daily by mouth (Patient not taking: Reported on 3/10/2021) 30 tablet 0     citalopram (CELEXA) 20 MG tablet TAKE 1 TABLET BY MOUTH EVERY DAY (Patient not taking: Reported on 3/10/2021) 30  tablet 0     No facility-administered encounter medications on file as of 3/10/2021.        Review Of Systems:  Skin: spots on face  Eyes: negative  Ears/Nose/Throat: negative  Respiratory: No shortness of breath, dyspnea on exertion, cough, or hemoptysis  Cardiovascular: negative  Gastrointestinal: negative  Genitourinary: negative  Musculoskeletal: negative  Neurologic: negative  Psychiatric: negative  Hematologic/Lymphatic/Immunologic: negative  Endocrine: negative      Objective:     /82   Pulse 70   SpO2 96%   Eyes: Conjunctivae/lids: Normal   ENT: Lips:  Normal  MSK: Normal  Cardiovascular: Peripheral edema none  Pulm: Breathing Normal  Neuro/Psych: Orientation: A/O x 3. Normal; Mood/Affect: Normal, NAD, WDWN  Pt accompanied by: self  Following areas examined: Scalp, face, eyelids, lips, neck, chest, abdomen, back, and R&L upper and lower extremities. Pt defers exam of buttock, hips, groin and genitals.   Huertas skin type:ii   Nodes: inguinal and axillary NLAD  Findings:  Brown, stuck-on scaly appearing papules on trunk and extremities.  Well circumscribed macules with symmetric color distribution on trunk and extremities.  Tan WD smooth macules on face, neck, trunk, and extremities.  Pink gritty macule/s x 6 on left medial orbital rim, left cheek, right cheek, left lateral upper cutaneous lip  Rhytides, hypo/hyperpigmentation, and atrophy  Inflamed brown, stuck-on scaly appearing papules on right temple x 1  Linear depigmented patch on right upper abdomen    Assessment and Plan:     1) Seborrheic keratoses, Benign nevi, Lentigines     I discussed the specifics of tumor, prognosis, and genetics of benign lesions.  I explained that treatment of these lesions would be purely cosmetic and not medically neccessary.  I discussed with patient different removal options including excision, cryotherapy, cautery and /or laser.  Lesion may recur and/or may not completely resolve. May need additional  treatment.     2) ISK x 1  Benign etiology and course of lesion.  LN2: Treated with LN2 for 5s for 1-2 cycles. Warned risks of blistering, pain, pigment change, scarring, and incomplete resolution.  Advised patient to return if lesions do not completely resolve within 2-3 months.  Wound care sheet given.  3)Actinic keratoses x 6 and solar elastosis    LN2 for 5 seconds x 2. Discussed AE include hypopigmentation (white spot) and recurrence. Follow up in 2-3 months to recheck lesions. There is a risk of AKs developing into a SCC.   Treatment options include LN2 vs PDT vs Efudex. Pt elected LN2    4) personal history of atypical nevus, family history of NMSC and MM  Pt to sign a MARY ANNE  Signs and Symptoms of non-melanoma skin cancer and ABCDEs of melanoma reviewed with patient. Patient encouraged to perform monthly self skin exams and educated on how to perform them. UV precautions reviewed with patient. Patient was asked about new or changing moles/lesions on body.   Wear a sunscreen with at least SPF 30 on your face, ears, neck and V of the chest daily. Wear sunscreen on other areas of the body if those areas are exposed to the sun throughout the day. Sunscreens can contain physical and/or chemical blockers. Physical blockers are less likely to clog pores, these include zinc oxide and titanium dioxide. Reapply every two hour and after swimming. Sunscreen examples include Neutrogena, CeraVe, Blue Lizard, Elta MD and many others.    Proper skin care from [facility]:    -Eliminate harsh soaps as they strip the natural oils from the skin, often resulting in dry itchy skin ( i.e. Dial, Zest, [place] Spring)  -Use mild soaps such as Cetaphil or Dove Sensitive Skin in the shower. You do not need to use soap on arms, legs, and trunk every time you shower unless visibly soiled.   -Avoid hot or cold showers.  -After showering, lightly dry off and apply moisturizing within 2-3 minutes. This will help trap moisture in the  skin.   -Aggressive use of a moisturizer at least 1-2 times a day to the entire body (including -Vanicream, Cetaphil, Aquaphor or Cerave) and moisturize hands after every washing.  -We recommend using moisturizers that come in a tub that needs to be scooped out, not a pump. This has more of an oil base. It will hold moisture in your skin much better than a water base moisturizer. The above recommended are non-pore clogging.         It was a pleasure speaking with Krystian Akins today.       Follow up in yearly FBE

## 2021-08-26 ENCOUNTER — OFFICE VISIT (OUTPATIENT)
Dept: INTERNAL MEDICINE | Facility: CLINIC | Age: 61
End: 2021-08-26
Payer: COMMERCIAL

## 2021-08-26 VITALS
OXYGEN SATURATION: 97 % | HEART RATE: 71 BPM | HEIGHT: 72 IN | TEMPERATURE: 97.8 F | WEIGHT: 175 LBS | RESPIRATION RATE: 16 BRPM | SYSTOLIC BLOOD PRESSURE: 126 MMHG | BODY MASS INDEX: 23.7 KG/M2 | DIASTOLIC BLOOD PRESSURE: 80 MMHG

## 2021-08-26 DIAGNOSIS — F41.9 ANXIETY: ICD-10-CM

## 2021-08-26 DIAGNOSIS — M77.12 LATERAL EPICONDYLITIS OF LEFT ELBOW: ICD-10-CM

## 2021-08-26 DIAGNOSIS — M54.16 LUMBAR RADICULOPATHY: ICD-10-CM

## 2021-08-26 DIAGNOSIS — E78.5 HYPERLIPIDEMIA LDL GOAL <100: ICD-10-CM

## 2021-08-26 PROCEDURE — 99214 OFFICE O/P EST MOD 30 MIN: CPT | Performed by: INTERNAL MEDICINE

## 2021-08-26 ASSESSMENT — MIFFLIN-ST. JEOR: SCORE: 1641.79

## 2021-08-26 NOTE — PROGRESS NOTES
ASSESSMENT:   1. Lumbar radiculopathy  Given chronicity of symptoms and pain radiating intermittently into the feet along with previous failure to respond to physical therapy, will obtain MRI of lumbar spine.  Further management based on results.  Possible LESI vs referral to neurosurgery vs medication therapy  - MR Lumbar Spine w/o Contrast; Future    2. Anxiety  Denies anxiety symptoms off of citalopram.  Continue to monitor    3. Hyperlipidemia LDL goal <100  Lipid levels at the point where statin would be indicated but patient has been hesitant to use.  Will get  CT Coronary Calcium scan schedule this previously ordered    4. Lateral epicondylitis of left elbow  Mild.  Discussed stretching exercises for the forearm musculature in the morning.  Ice to lateral epicondyle area at night as needed.  May also try using forearm brace when playing the topic.  If symptoms persisting despite this, patient due for physician and will refer to orthopedics for possible cortisone injection versus other      PLAN:   MRI lumbar spine at [facility].  They will call you or call [phone]   CT Coronary Calcium scan at [facility]. When you speak with schedulers re: MRI, tell them to also scheduled CT  If positive CT score, then will start statin therapy for cholesterol elevation   Stretching left lateral epicondyle/forearm in AM. Ice later part of the day.  May also try using a forearm brace while playing trumpet.  If epicondyle symptoms do not improve with these treatments, then send me a [employer] message and will refer to [facility]      (Chart documentation was completed, in part, with [employer] voice-recognition software. Even though reviewed, some grammatical, spelling, and word errors may remain.)    Richard Purcell MD  Internal Medicine Department  [facility]      Tami Boland is a 61 year old who presents for the following health issu+es     HPI     Back Pain  Onset/Duration:  ongoing >10 YEARS  Description:   Location of pain: low back bilateral  Character of pain: SCIATIC Nerve  Pain radiation: radiates into the right foot and radiates into the left foot (L>R)  New numbness or weakness in legs, not attributed to pain: YES- more feet  Intensity: moderate  Progression of Symptoms: worsening, constant and waxing and waning. Worse with lying down but also  With prolonged sitting  History:   Specific cause: Skiing Accident 20 years ago  Pain interferes with job: not applicable  History of back problems: Bulging disc   Any previous MRI or X-rays: Yes--at [facility] .  Date 5-10 years ago  Sees a specialist for back pain: none   Alleviating factors:   Improved by: chiropractor    Precipitating factors:  Worsened by: Bending, Standing, Sitting and Lying Flat  Therapies tried and outcome: chiropractor-some relief. Seeing now. Helps back but not legs with pain radiation    Accompanying Signs & Symptoms:  Risk of Fracture: None  Risk of Cauda Equina: None  Risk of Infection: None  Risk of Cancer: None  Risk of Ankylosing Spondylitis: Onset at age <35, male, AND morning back stiffness no       Biking 15-20 min every other day. Rare  back and leg sx when exercising.  Back sx as above usually with lying down and prolonged sitting.  Did PT for 3 mos 6 years ago for back and not very helpful   Retired last year. Mood better. Less stress and anxiety. Off Citalopram   Hx elevated lipids. Never had   CT Coronary Calcium scan as previously ordered to determine if coronary plaque present.  Lipids elevated to the point where statin would be indicated but patient has been hesitant to use last preventive CAD.  Occ soreness left lateral epicondyle  area with trumpet playing - chronic          Additional ROS:   Constitutional, HEENT, Cardiovascular, Pulmonary, GI and , Neuro, MSK and Psych review of systems/symptoms are otherwise negative or unchanged from previous, except as noted above.      OBJECTIVE:  /80    Pulse 71   Temp 97.8  F (36.6  C) (Temporal)   Resp 16   Ht 1.829 m (6')   Wt 79.4 kg (175 lb)   SpO2 97%   BMI 23.73 kg/m     Estimated body mass index is 23.73 kg/m  as calculated from the following:    Height as of this encounter: 1.829 m (6').    Weight as of this encounter: 79.4 kg (175 lb).     Neck: no adenopathy. Thyroid normal to palpation. No bruits  Pulm: Lungs clear to auscultation   CV: Regular rates and rhythm  GI: Soft, nontender, Normal active bowel sounds, No hepatosplenomegaly or masses palpable  Ext: Peripheral pulses intact. No edema. Left lateral epicondyle tender to palpation and to supination of forearm against resistance.   Neuro: Normal strength and tone, sensory exam grossly normal  Back: Mild tenderness to palpation bilateral paralumbar area. Neg SLRT bilaterally.

## 2021-08-26 NOTE — PATIENT INSTRUCTIONS
MRI lumbar spine at [facility].  They will call you or call [phone]   CT Coronary Calcium scan at [facility]. When you speak with schedulers re: MRI, tell them to also scheduled CT  If positive CT score, then will start statin therapy for cholesterol elevation   Stretching left lateral epicondyle/forearm in AM. Ice later part of the day.  May also try using a forearm brace while playing trumpet.  If epicondyle symptoms do not improve with these treatments, then send me a MyChart message and will refer to [facility]

## 2021-08-28 PROBLEM — M77.12 LATERAL EPICONDYLITIS OF LEFT ELBOW: Status: ACTIVE | Noted: 2021-08-28

## 2021-08-28 PROBLEM — M54.16 LUMBAR RADICULOPATHY: Status: ACTIVE | Noted: 2021-08-28

## 2021-08-29 ENCOUNTER — HEALTH MAINTENANCE LETTER (OUTPATIENT)
Age: 61
End: 2021-08-29

## 2021-09-11 ENCOUNTER — HOSPITAL ENCOUNTER (OUTPATIENT)
Dept: MRI IMAGING | Facility: CLINIC | Age: 61
Discharge: HOME | End: 2021-09-11
Attending: INTERNAL MEDICINE | Admitting: INTERNAL MEDICINE
Payer: COMMERCIAL

## 2021-09-11 DIAGNOSIS — M54.16 LUMBAR RADICULOPATHY: ICD-10-CM

## 2021-09-11 PROCEDURE — 72148 MRI LUMBAR SPINE W/O DYE: CPT

## 2021-09-22 ENCOUNTER — HOSPITAL ENCOUNTER (OUTPATIENT)
Dept: CT IMAGING | Facility: CLINIC | Age: 61
End: 2021-09-22
Attending: INTERNAL MEDICINE
Payer: COMMERCIAL

## 2021-09-22 DIAGNOSIS — E78.5 HYPERLIPIDEMIA LDL GOAL <100: ICD-10-CM

## 2021-09-22 PROCEDURE — 75571 CT HRT W/O DYE W/CA TEST: CPT

## 2021-09-22 PROCEDURE — 75571 CT HRT W/O DYE W/CA TEST: CPT | Mod: 26 | Performed by: INTERNAL MEDICINE

## 2021-09-23 ENCOUNTER — MYC MEDICAL ADVICE (OUTPATIENT)
Dept: INTERNAL MEDICINE | Facility: CLINIC | Age: 61
End: 2021-09-23

## 2021-10-05 ENCOUNTER — MYC MEDICAL ADVICE (OUTPATIENT)
Dept: INTERNAL MEDICINE | Facility: CLINIC | Age: 61
End: 2021-10-05

## 2021-10-05 DIAGNOSIS — E78.5 HYPERLIPIDEMIA LDL GOAL <100: Primary | ICD-10-CM

## 2021-10-05 DIAGNOSIS — M54.16 LUMBAR RADICULOPATHY: ICD-10-CM

## 2021-10-06 NOTE — TELEPHONE ENCOUNTER
Dr. Purcell,     Patient would like to discuss the results of his MRI from 9/11/21. Please see  message from 9/23/21.    Roseline Pardo RN

## 2021-10-08 RX ORDER — ATORVASTATIN CALCIUM 40 MG/1
40 TABLET, FILM COATED ORAL DAILY
Qty: 30 TABLET | Refills: 11 | Status: SHIPPED | OUTPATIENT
Start: 2021-10-08 | End: 2022-03-13

## 2021-10-13 NOTE — TELEPHONE ENCOUNTER
Previously spoke with patient by phone 10/7/21 but late in documentation until now.  First reviewed patient's CT calcium coronary screening test results.  Back along with elevated lipids, decision was made for patient to start atorvastatin 40 mg daily and patient was instructed to have fasting labs repeated in 1 month as ordered and to inform physician earlier if any side effects with statin therapy.  Also discussed MRI results.  MRI shows disc abutment of the descending right L3 nerve root without definite impingement.  There is no other significant disc bulging or spinal canal narrowing at other levels.  The changes at L4-5 showed some bilateral neural foraminal narrowing but no obvious nerve impingement.  Patient has symptoms in bilateral legs to the feet and symptoms generally worse on left compared to right which is not correlating well with the MRI findings.  Recommended therefore that patient see Dr. Stevie De Paz from physiatry for further consultation.  Referral was placed and patient was given telephone number to call and schedule appointment.  Most recent clinic visit note and MRI  faxed to Dr. De Paz's office for future appt

## 2021-10-24 ENCOUNTER — HEALTH MAINTENANCE LETTER (OUTPATIENT)
Age: 61
End: 2021-10-24

## 2021-11-09 ENCOUNTER — LAB (OUTPATIENT)
Dept: LAB | Facility: CLINIC | Age: 61
End: 2021-11-09
Payer: COMMERCIAL

## 2021-11-09 DIAGNOSIS — E78.5 HYPERLIPIDEMIA LDL GOAL <100: ICD-10-CM

## 2021-11-09 LAB
ALBUMIN SERPL-MCNC: 4.3 G/DL (ref 3.4–5)
ALP SERPL-CCNC: 70 U/L (ref 40–150)
ALT SERPL W P-5'-P-CCNC: 51 U/L (ref 0–70)
AST SERPL W P-5'-P-CCNC: 25 U/L (ref 0–45)
BILIRUB DIRECT SERPL-MCNC: 0.2 MG/DL (ref 0–0.2)
BILIRUB SERPL-MCNC: 0.7 MG/DL (ref 0.2–1.3)
CHOLEST SERPL-MCNC: 172 MG/DL
CK SERPL-CCNC: 81 U/L (ref 30–300)
FASTING STATUS PATIENT QL REPORTED: YES
HDLC SERPL-MCNC: 45 MG/DL
LDLC SERPL CALC-MCNC: 107 MG/DL
NONHDLC SERPL-MCNC: 127 MG/DL
PROT SERPL-MCNC: 7.7 G/DL (ref 6.8–8.8)
TRIGL SERPL-MCNC: 99 MG/DL

## 2021-11-09 PROCEDURE — 80076 HEPATIC FUNCTION PANEL: CPT

## 2021-11-09 PROCEDURE — 82550 ASSAY OF CK (CPK): CPT

## 2021-11-09 PROCEDURE — 36415 COLL VENOUS BLD VENIPUNCTURE: CPT

## 2021-11-09 PROCEDURE — 80061 LIPID PANEL: CPT

## 2021-11-23 ENCOUNTER — TRANSFERRED RECORDS (OUTPATIENT)
Dept: HEALTH INFORMATION MANAGEMENT | Facility: CLINIC | Age: 61
End: 2021-11-23
Payer: COMMERCIAL

## 2022-02-10 DIAGNOSIS — E78.5 HYPERLIPIDEMIA LDL GOAL <100: ICD-10-CM

## 2022-02-11 RX ORDER — ATORVASTATIN CALCIUM 40 MG/1
TABLET, FILM COATED ORAL
Qty: 30 TABLET | Refills: 11 | OUTPATIENT
Start: 2022-02-11

## 2022-03-04 ENCOUNTER — MYC MEDICAL ADVICE (OUTPATIENT)
Dept: INTERNAL MEDICINE | Facility: CLINIC | Age: 62
End: 2022-03-04
Payer: COMMERCIAL

## 2022-03-04 NOTE — TELEPHONE ENCOUNTER
Called pt and discussed [facility] message. He is going to call [facility] and get his refills sent back over to [facility]. Advised pt that if he is having difficulty then he should call us back.     Roseline Pardo RN

## 2022-03-12 ENCOUNTER — MYC MEDICAL ADVICE (OUTPATIENT)
Dept: INTERNAL MEDICINE | Facility: CLINIC | Age: 62
End: 2022-03-12
Payer: COMMERCIAL

## 2022-03-12 DIAGNOSIS — E78.5 HYPERLIPIDEMIA LDL GOAL <100: ICD-10-CM

## 2022-03-12 DIAGNOSIS — Z12.5 SCREENING FOR PROSTATE CANCER: Primary | ICD-10-CM

## 2022-03-13 RX ORDER — ATORVASTATIN CALCIUM 40 MG/1
40 TABLET, FILM COATED ORAL DAILY
Qty: 90 TABLET | Refills: 2 | Status: SHIPPED | OUTPATIENT
Start: 2022-03-13 | End: 2022-12-24

## 2022-08-26 ENCOUNTER — OFFICE VISIT (OUTPATIENT)
Dept: DERMATOLOGY | Facility: CLINIC | Age: 62
End: 2022-08-26
Payer: COMMERCIAL

## 2022-08-26 DIAGNOSIS — L82.0 INFLAMED SEBORRHEIC KERATOSIS: ICD-10-CM

## 2022-08-26 DIAGNOSIS — L57.0 ACTINIC KERATOSIS: Primary | ICD-10-CM

## 2022-08-26 DIAGNOSIS — Z87.898 HISTORY OF ATYPICAL NEVUS: ICD-10-CM

## 2022-08-26 PROCEDURE — 17000 DESTRUCT PREMALG LESION: CPT | Mod: 59 | Performed by: PHYSICIAN ASSISTANT

## 2022-08-26 PROCEDURE — 99212 OFFICE O/P EST SF 10 MIN: CPT | Mod: 25 | Performed by: PHYSICIAN ASSISTANT

## 2022-08-26 PROCEDURE — 17003 DESTRUCT PREMALG LES 2-14: CPT | Mod: 59 | Performed by: PHYSICIAN ASSISTANT

## 2022-08-26 PROCEDURE — 17110 DESTRUCTION B9 LES UP TO 14: CPT | Performed by: PHYSICIAN ASSISTANT

## 2022-08-26 NOTE — PROGRESS NOTES
HPI:   Chief complaints: Krystian Akins is a pleasant 62 year old male who presents for evaluation of several spots on the face. The spots are raised and will not resolve. He does have a fhx of melanoma and a personal history of atypical nevi with one severely atypical nevus. He declines a FSE today.       PHYSICAL EXAM:    There were no vitals taken for this visit.  Skin exam performed as follows: Type 2 skin. Mood appropriate  Alert and Oriented X 3. Well developed, well nourished in no distress.  General appearance: Normal  Head including face: Normal  Eyes: conjunctiva and lids: Normal  Mouth: Lips, teeth, gums: Normal  Neck: Normal  Cardiovascular: Exam of peripheral vascular system by observation for swelling, varicosities, edema: Normal  Right upper extremity: Normal  Left upper extremity: Normal  Right lower extremity: Normal  Left lower extremity: Normal  Skin: Scalp and body hair: See below    Pink gritty papule on the left cheek x 1, left nasal bridge x 1, mid nasal bridge x 1, right nasal bridge x 2  Inflamed keratotic papules on the right cheek x 3, left temple x 1    ASSESSMENT/PLAN:     1. Actinic keratosis on the left cheek x 1, left nasal bridge x 1, mid nasal bridge x 1, right nasal bridge x 2. As precancerous, cryosurgery performed. Advised on blistering and post-op care. Advised if not resolved in 1-2 months to return for evaluation  2. Inflamed seborrheic keratosis on the right cheek x 3, left temple x 1. As physically tender cryosurgery performed. Advised on post op care.   3. History of atypical nevi, fhx of melanoma - he will schedule a FSE           Follow-up: FSE/PRN sooner  CC:   Scribed By: Monika Hui, MS, PAYomairaC

## 2022-08-26 NOTE — LETTER
8/26/2022         RE: Krystian Akins  [address]  [place] [address]        Dear Colleague,    Thank you for referring your patient, Krystian Akins, to the [facility]. Please see a copy of my visit note below.    HPI:   Chief complaints: Krystian Akins is a pleasant 62 year old male who presents for evaluation of several spots on the face. The spots are raised and will not resolve. He does have a fhx of melanoma and a personal history of atypical nevi with one severely atypical nevus. He declines a FSE today.       PHYSICAL EXAM:    There were no vitals taken for this visit.  Skin exam performed as follows: Type 2 skin. Mood appropriate  Alert and Oriented X 3. Well developed, well nourished in no distress.  General appearance: Normal  Head including face: Normal  Eyes: conjunctiva and lids: Normal  Mouth: Lips, teeth, gums: Normal  Neck: Normal  Cardiovascular: Exam of peripheral vascular system by observation for swelling, varicosities, edema: Normal  Right upper extremity: Normal  Left upper extremity: Normal  Right lower extremity: Normal  Left lower extremity: Normal  Skin: Scalp and body hair: See below    Pink gritty papule on the left cheek x 1, left nasal bridge x 1, mid nasal bridge x 1, right nasal bridge x 2  Inflamed keratotic papules on the right cheek x 3, left temple x 1    ASSESSMENT/PLAN:     1. Actinic keratosis on the left cheek x 1, left nasal bridge x 1, mid nasal bridge x 1, right nasal bridge x 2. As precancerous, cryosurgery performed. Advised on blistering and post-op care. Advised if not resolved in 1-2 months to return for evaluation  2. Inflamed seborrheic keratosis on the right cheek x 3, left temple x 1. As physically tender cryosurgery performed. Advised on post op care.   3. History of atypical nevi, fhx of melanoma - he will schedule a FSE           Follow-up: FSE/PRN sooner  CC:   Scribed By: Monika Hui, MS, PA-C          Again,  thank you for allowing me to participate in the care of your patient.        Sincerely,        Monika Dunbar PA-C

## 2022-09-01 NOTE — PROGRESS NOTES
ASSESSMENT & PLAN  Patient Instructions     1. Acute pain of right shoulder    2. Impingement syndrome of right shoulder      -Patient has right shoulder pain due to bursitis and tendinitis  -Patient will start formal physical therapy and home exercise program  -Patient will avoid specific workouts and activities that aggravate his pain  -Patient may take over-the-counter pain medications as needed.  Patient can also ice the shoulder as needed  -Patient will follow up if pain does not improve  -Call direct clinic number [[phone]] at any time with questions or concerns.    Albert Yeo MD [facility]  [facility]         -----    SUBJECTIVE  Krystian Akins is a/an 62 year old Right handed male who is seen as a self referral for evaluation of right shoulder pain. The patient is seen by themselves.    Onset: 3 month(s) ago. Reports insidious onset without acute precipitating event.  Location of Pain: right shoulder, lateral shoulder, non radiating   Rating of Pain at worst: 8/10  Rating of Pain Currently: 2/10  Worsened by: reaching behind him, reaching above his head  Better with: activity avoidance   Treatments tried: no treatment tried to date  Associated symptoms: no distal numbness or tingling; denies swelling or warmth, denies weakness   Orthopedic history: YES - Date: 20 years ago injury throwing baseball, did at home exercises from MD for treatment   Relevant surgical history: NO  Social history: social history: retired, likes to weight lift, HIIT training, exercises 5x/week    Past Medical History:   Diagnosis Date     Arrhythmia     no s/s     Gastric ulcer 2015     Generalized anxiety disorder      GI bleed      HLD (hyperlipidemia)      Hyperlipidemia LDL goal <100      Migraine      Nephrolithiasis      Social History     Socioeconomic History     Marital status:    Tobacco Use     Smoking status: Never Smoker     Smokeless tobacco: Never Used    Substance and Sexual Activity     Alcohol use: Yes     Comment: 2 or 3 drinks a week     Drug use: Never         Patient's past medical, surgical, social, and family histories were reviewed today and no changes are noted.    REVIEW OF SYSTEMS:  10 point ROS is negative other than symptoms noted above in HPI, Past Medical History or as stated below  Constitutional: NEGATIVE for fever, chills, change in weight  Skin: NEGATIVE for worrisome rashes, moles or lesions  GI/: NEGATIVE for bowel or bladder changes  Neuro: NEGATIVE for weakness, dizziness or paresthesias    OBJECTIVE:  /84   Ht 1.829 m (6')   Wt 83.1 kg (183 lb 3.2 oz)   BMI 24.85 kg/m     General: healthy, alert and in no distress  HEENT: no scleral icterus or conjunctival erythema  Skin: no suspicious lesions or rash. No jaundice.  CV: regular rhythm by palpation  Resp: normal respiratory effort without conversational dyspnea   Psych: normal mood and affect  Gait: normal steady gait with appropriate coordination and balance  Neuro: normal light touch sensory exam of the bilateral upper extremities.    MSK:  RIGHT SHOULDER  Inspection:    no swelling, bruising, discoloration, or obvious deformity or asymmetry  Palpation:  bony and tendinous landmarks are nontender.  Active Range of Motion:     Abduction 1350, FF 1650, , IR L4.      Scapular dyskinesis absent  Strength:    Scapular plane abduction grossly intact,  ER grossly intact, IR grossly intact, biceps grossly intact, triceps grossly intact  Special Tests:    Positive: Neer's, Mccormick' and supraspinatus (empty can)    Negative: drop arm/painful arc, crossed arm adduction, Evant's, Speed's and Yergason's    Independent visualization of the below image:    Personal review of right shoulder x-rays taken office today show no acute fracture, dislocation or osseous degenerative abnormalities.        Albert Yeo MD [facility]

## 2022-09-07 ENCOUNTER — OFFICE VISIT (OUTPATIENT)
Dept: ORTHOPEDICS | Facility: CLINIC | Age: 62
End: 2022-09-07
Payer: COMMERCIAL

## 2022-09-07 VITALS
WEIGHT: 183.2 LBS | HEIGHT: 72 IN | SYSTOLIC BLOOD PRESSURE: 124 MMHG | BODY MASS INDEX: 24.81 KG/M2 | DIASTOLIC BLOOD PRESSURE: 84 MMHG

## 2022-09-07 DIAGNOSIS — M25.511 ACUTE PAIN OF RIGHT SHOULDER: Primary | ICD-10-CM

## 2022-09-07 DIAGNOSIS — M75.41 IMPINGEMENT SYNDROME OF RIGHT SHOULDER: ICD-10-CM

## 2022-09-07 PROCEDURE — 99204 OFFICE O/P NEW MOD 45 MIN: CPT | Performed by: FAMILY MEDICINE

## 2022-09-07 NOTE — PATIENT INSTRUCTIONS
1. Acute pain of right shoulder    2. Impingement syndrome of right shoulder      -Patient has right shoulder pain due to bursitis and tendinitis  -Patient will start formal physical therapy and home exercise program  -Patient will avoid specific workouts and activities that aggravate his pain  -Patient may take over-the-counter pain medications as needed.  Patient can also ice the shoulder as needed  -Patient will follow up if pain does not improve  -Call direct clinic number [[phone]] at any time with questions or concerns.    Albert Yeo MD [facility]  [facility] [place]

## 2022-09-07 NOTE — LETTER
9/7/2022         RE: Krystian Akins  [address]  [place] [address]        Dear Colleague,    Thank you for referring your patient, Krystian Akins, to the [facility] [place]. Please see a copy of my visit note below.    ASSESSMENT & PLAN  Patient Instructions     1. Acute pain of right shoulder    2. Impingement syndrome of right shoulder      -Patient has right shoulder pain due to bursitis and tendinitis  -Patient will start formal physical therapy and home exercise program  -Patient will avoid specific workouts and activities that aggravate his pain  -Patient may take over-the-counter pain medications as needed.  Patient can also ice the shoulder as needed  -Patient will follow up if pain does not improve  -Call direct clinic number [[phone]] at any time with questions or concerns.    Albert Yeo MD [facility] Orthopedics and [facility] Medicine  [facility] [place]         -----    SUBJECTIVE  Krystian Akins is a/an 62 year old Right handed male who is seen as a self referral for evaluation of right shoulder pain. The patient is seen by themselves.    Onset: 3 month(s) ago. Reports insidious onset without acute precipitating event.  Location of Pain: right shoulder, lateral shoulder, non radiating   Rating of Pain at worst: 8/10  Rating of Pain Currently: 2/10  Worsened by: reaching behind him, reaching above his head  Better with: activity avoidance   Treatments tried: no treatment tried to date  Associated symptoms: no distal numbness or tingling; denies swelling or warmth, denies weakness   Orthopedic history: YES - Date: 20 years ago injury throwing baseball, did at home exercises from MD for treatment   Relevant surgical history: NO  Social history: social history: retired, likes to weight lift, HIIT training, exercises 5x/week    Past Medical History:   Diagnosis Date     Arrhythmia     no s/s     Gastric ulcer 2015     Generalized anxiety  disorder      GI bleed      HLD (hyperlipidemia)      Hyperlipidemia LDL goal <100      Migraine      Nephrolithiasis      Social History     Socioeconomic History     Marital status:    Tobacco Use     Smoking status: Never Smoker     Smokeless tobacco: Never Used   Substance and Sexual Activity     Alcohol use: Yes     Comment: 2 or 3 drinks a week     Drug use: Never         Patient's past medical, surgical, social, and family histories were reviewed today and no changes are noted.    REVIEW OF SYSTEMS:  10 point ROS is negative other than symptoms noted above in HPI, Past Medical History or as stated below  Constitutional: NEGATIVE for fever, chills, change in weight  Skin: NEGATIVE for worrisome rashes, moles or lesions  GI/: NEGATIVE for bowel or bladder changes  Neuro: NEGATIVE for weakness, dizziness or paresthesias    OBJECTIVE:  /84   Ht 1.829 m (6')   Wt 83.1 kg (183 lb 3.2 oz)   BMI 24.85 kg/m     General: healthy, alert and in no distress  HEENT: no scleral icterus or conjunctival erythema  Skin: no suspicious lesions or rash. No jaundice.  CV: regular rhythm by palpation  Resp: normal respiratory effort without conversational dyspnea   Psych: normal mood and affect  Gait: normal steady gait with appropriate coordination and balance  Neuro: normal light touch sensory exam of the bilateral upper extremities.    MSK:  RIGHT SHOULDER  Inspection:    no swelling, bruising, discoloration, or obvious deformity or asymmetry  Palpation:  bony and tendinous landmarks are nontender.  Active Range of Motion:     Abduction 1350, FF 1650, , IR L4.      Scapular dyskinesis absent  Strength:    Scapular plane abduction grossly intact,  ER grossly intact, IR grossly intact, biceps grossly intact, triceps grossly intact  Special Tests:    Positive: Neer's, Mccormick' and supraspinatus (empty can)    Negative: drop arm/painful arc, crossed arm adduction, Walsh's, Speed's and  Jolanta's    Independent visualization of the below image:    Personal review of right shoulder x-rays taken office today show no acute fracture, dislocation or osseous degenerative abnormalities.        Albert Yeo MD [facility]        Again, thank you for allowing me to participate in the care of your patient.        Sincerely,        Albert Yeo, MD

## 2022-09-09 ENCOUNTER — THERAPY VISIT (OUTPATIENT)
Dept: PHYSICAL THERAPY | Facility: CLINIC | Age: 62
End: 2022-09-09
Attending: FAMILY MEDICINE
Payer: COMMERCIAL

## 2022-09-09 DIAGNOSIS — M75.41 IMPINGEMENT SYNDROME OF RIGHT SHOULDER: ICD-10-CM

## 2022-09-09 DIAGNOSIS — M25.511 ACUTE PAIN OF RIGHT SHOULDER: ICD-10-CM

## 2022-09-09 PROCEDURE — 97161 PT EVAL LOW COMPLEX 20 MIN: CPT | Mod: GP | Performed by: PHYSICAL THERAPIST

## 2022-09-09 PROCEDURE — 97110 THERAPEUTIC EXERCISES: CPT | Mod: GP | Performed by: PHYSICAL THERAPIST

## 2022-09-09 PROCEDURE — 97112 NEUROMUSCULAR REEDUCATION: CPT | Mod: GP | Performed by: PHYSICAL THERAPIST

## 2022-09-09 NOTE — PROGRESS NOTES
[facility]    OUTPATIENT Physical Therapy ORTHOPEDIC EVALUATION  PLAN OF TREATMENT FOR OUTPATIENT REHABILITATION  (COMPLETE FOR INITIAL CLAIMS ONLY)  Patient's Last Name, First Name, M.I.  YOB: 1960  Krystian Akins    Provider s Name:  [facility]   Medical Record No.  [id number]   Start of Care Date:  09/09/22   Onset Date:   09/07/22   Type:     _X__PT   ___OT Medical Diagnosis:    Encounter Diagnoses   Name Primary?    Acute pain of right shoulder     Impingement syndrome of right shoulder         Treatment Diagnosis:  Right shoulder pain, impingement        Goals:     09/09/22 0500   Body Part   Goals listed below are for right shoulder   Goal #1   Goal #1 reaching   Previous Functional Level No restrictions   Current Functional Level Can reach ;behind back;to shoulder level;overhead;out to the side;across body   Performance level with pain   STG Target Performance Reach ;out to the side;overhead;across body;to counter height;behind back   Performance level with improved motion, < 2/10 pain   Rationale for bathing;for dressing;for independent personal hygiene;for meal preparation;for care of infant/toddler;for job requirements in their work place;for safe driving, hook seat belt, reach shift lever and turn signal, using both hands on steering wheel   Due date 09/30/22   LTG Target Performance Unrestricted reaching   Performance Level pain free   Rationale for safe driving, hook seat belt, reach shift lever and turn signal, using both hands on steering wheel;for job requirements in their work place;for care of infant/toddler;for bathing;for meal preparation;for dressing;for independent personal hygiene   Due date 11/18/22   Goal #2   Goal #2 lifting/carrying   Previous Functional Level No restrictions   Current Functional Level Can lift;an item overhead weighing    Performance level < 5 pounds due to pain   STG Target Performance Lift an item overhead weighing   Performance level > 5 pounds with less than 2/10 pain   Rationale for housework such as laundry, emptying garbage, use of ;for meal preparation;for grocery shopping;for safe care of infant/toddler;for yard work such as shoveling snow;to perform job duties at work   Due date 09/30/22   LTG Target Performance Lift an item overhead weighing   Performance Level unrestricted pain free   Rationale for housework such as laundry, emptying garbage, use of ;for yard work such as shoveling snow;to perform job duties at work;for meal preparation;for grocery shopping;for safe care of infant/toddler   Due date 11/18/22       Therapy Frequency:  1 x week  Predicted Duration of Therapy Intervention:  10 weeks    Hebert Morgan, PT                 I CERTIFY THE NEED FOR THESE SERVICES FURNISHED UNDER        THIS PLAN OF TREATMENT AND WHILE UNDER MY CARE     (Physician attestation of this document indicates review and certification of the therapy plan).                     Certification Date From:  09/09/22   Certification Date To:  12/09/22    Referring Provider:  Albert Yeo    Initial Assessment        See Epic Evaluation SOC Date: 09/09/22

## 2022-09-09 NOTE — PROGRESS NOTES
[facility] Initial Evaluation     Present: no    Subjective:  Krystian Akins is a 62 year old male with complaints of right shoulder . Pt reports that he started gradually getting in pain in the right shoulder without known cause. Thought it might have been related to a old throwing injury 20 years ago. Chief complaint is anterior/lateral right shoulder pain worse with reaching, lifting, and sleeping postures. Denies vague symptoms. Wants to keep up vertical pressing(weightlifting) motions pain free and get rid of pain with reaching/lifting positions.     Symptoms commenced as a result of: stretching in the morning at home 3-4 months ago. Condition occurred in the following environment: home. Onset of symptoms: 9/7/22(date of MD PT order). Location of symptoms: lateral right upper arm. Pain level on number scale: 2/10. Quality of pain: aching, piercing. Associated symptoms: none. Pain frequency (constant/intermittent): intermittent. Symptoms are exacerbated by: reaching, lifting, sleeping at times. Symptoms are relieved by: avoidance, certain positions. Progression of symptoms since onset: same. Imaging: Xray unremarkable. Previous treatment: none. Response to previous treatment: none. General health as reported by patient is excellent. Pertinent medical history includes: See Epic. Medical allergies: see Epic. Other pertinent surgeries: see Epic. Current medications: See Epic. Occupation: retired. Work/restriction status: none. Primary job tasks: daily tasks. Barriers at home/work: None reported by patient. Red flags: None reported by patient.    Objective  Posture: forward head, rounded shoulders    Scapular positioning: unremarkable    *=painful    Shoulder AROM (* = pain) Right Left           180   * 180   ER/HBH 70* 70   IR/HBB T10* T7     Shoulder PROM (* = pain) Right Left   * 175*   * 175*   ER In 90 abduction 70* In 90 abduction 80*   IR In 90  abduction 50* In 90 abduction 70     Shoulder Strength (* = pain) Right Left   FL 4+/5* 5/5   ABD 5-/5 5/5   ER (0 abduction) 5/5 5/5   ER (90 abduction) NT/5 NT/5   IR 5/5 5/5   Biceps 5/5 5/5     Special Tests Right Left   MccormickAlexi + -   Neer - -   Painful Arc - -   Apprehension - -   External Rotation Lag  - -   Sulcus sign - -     Palpation tenderness: unremarkable    Other Tests: hypomobile AP/INF glide right shoulder without pain compared to the left    Key Findings  3+ months right shoulder impingement with decreased ROM all planes without severe limitation, mild right shoulder weakness without obvious sign of cuff tear.   Assessment/Plan:    Patient is a 62 year old male with right side shoulder complaints.    Patient has the following significant findings with corresponding treatment plan.                Diagnosis 1:  Acute right shoulder, impingement right shoulder  Pain -  manual therapy, self management, education and home program  Decreased ROM/flexibility - manual therapy and therapeutic exercise  Decreased joint mobility - manual therapy and therapeutic exercise  Decreased strength - therapeutic exercise and therapeutic activities  Impaired muscle performance - neuro re-education  Decreased function - therapeutic activities  Impaired posture - neuro re-education    Therapy Evaluation Codes:     1) History comprised of:   Personal factors that impact the plan of care:      Time since onset of symptoms.    Comorbidity factors that impact the plan of care are:      None.     Medications impacting care: None.  2) Examination of Body Systems comprised of:   Body structures and functions that impact the plan of care:      Shoulder.   Activity limitations that impact the plan of care are:      Bathing, Driving, Dressing, Lifting, Sports, Sleeping and Laying down.  3) Clinical presentation characteristics are:   Stable/Uncomplicated.  4) Decision-Making    Low complexity using standardized patient  assessment instrument and/or measureable assessment of functional outcome.    Cumulative Therapy Evaluation is: Low complexity.    Previous and current functional limitations:  (See Goal Flow Sheet for this information)    Short term and Long term goals: (See Goal Flow Sheet for this information)     Communication ability:  Patient appears to be able to clearly communicate and understand verbal and written communication and follow directions correctly.  Treatment Explanation - The following has been discussed with the patient:   RX ordered/plan of care  Anticipated outcomes  Possible risks and side effects  This patient would benefit from PT intervention to resume normal activities.   Rehab potential is good.    Frequency:  1 X week, once daily  Duration:  for 10 weeks  Discharge Plan:  Achieve all LTG.  Independent in home treatment program.  Reach maximal therapeutic benefit.    Please refer to the daily flowsheet for treatment today, total treatment time and time spent performing 1:1 timed codes.     Inquires  Hebert Morgan PT, DPT, CSCS  Physical Therapist  [facility] [place]  [address] [facility], [address] [address] [phone]

## 2022-09-26 ENCOUNTER — THERAPY VISIT (OUTPATIENT)
Dept: PHYSICAL THERAPY | Facility: CLINIC | Age: 62
End: 2022-09-26
Payer: COMMERCIAL

## 2022-09-26 DIAGNOSIS — M25.511 ACUTE PAIN OF RIGHT SHOULDER: Primary | ICD-10-CM

## 2022-09-26 PROCEDURE — 97112 NEUROMUSCULAR REEDUCATION: CPT | Mod: GP | Performed by: PHYSICAL THERAPIST

## 2022-09-26 PROCEDURE — 97110 THERAPEUTIC EXERCISES: CPT | Mod: GP | Performed by: PHYSICAL THERAPIST

## 2022-10-15 ENCOUNTER — HEALTH MAINTENANCE LETTER (OUTPATIENT)
Age: 62
End: 2022-10-15

## 2022-10-20 ENCOUNTER — THERAPY VISIT (OUTPATIENT)
Dept: PHYSICAL THERAPY | Facility: CLINIC | Age: 62
End: 2022-10-20
Payer: COMMERCIAL

## 2022-10-20 DIAGNOSIS — M25.511 ACUTE PAIN OF RIGHT SHOULDER: Primary | ICD-10-CM

## 2022-10-20 PROCEDURE — 97110 THERAPEUTIC EXERCISES: CPT | Mod: GP | Performed by: PHYSICAL THERAPIST

## 2022-12-22 DIAGNOSIS — E78.5 HYPERLIPIDEMIA LDL GOAL <100: ICD-10-CM

## 2022-12-24 RX ORDER — ATORVASTATIN CALCIUM 40 MG/1
TABLET, FILM COATED ORAL
Qty: 90 TABLET | Refills: 0 | Status: SHIPPED | OUTPATIENT
Start: 2022-12-24 | End: 2023-03-02

## 2023-02-02 ENCOUNTER — THERAPY VISIT (OUTPATIENT)
Dept: PHYSICAL THERAPY | Facility: CLINIC | Age: 63
End: 2023-02-02
Payer: COMMERCIAL

## 2023-02-02 DIAGNOSIS — M25.511 ACUTE PAIN OF RIGHT SHOULDER: Primary | ICD-10-CM

## 2023-02-02 PROCEDURE — 97110 THERAPEUTIC EXERCISES: CPT | Mod: GP | Performed by: PHYSICAL THERAPIST

## 2023-02-02 PROCEDURE — 97112 NEUROMUSCULAR REEDUCATION: CPT | Mod: GP | Performed by: PHYSICAL THERAPIST

## 2023-02-02 NOTE — PROGRESS NOTES
[facility]    OUTPATIENT Physical Therapy ORTHOPEDIC EVALUATION  PLAN OF TREATMENT FOR OUTPATIENT REHABILITATION  (COMPLETE FOR INITIAL CLAIMS ONLY)  Patient's Last Name, First Name, M.I.  YOB: 1960  Krystian Akins    Provider s Name:  [facility]   Medical Record No.  [id number]   Start of Care Date:  09/09/22   Onset Date:   09/07/22   Treatment Diagnosis:  Right shoulder pain, impingement Medical Diagnosis:  Acute pain of right shoulder       Goals:     02/02/23 0500   Body Part   Goals listed below are for right shoulder   Goal #1   Goal #1 reaching   Previous Functional Level No restrictions   Current Functional Level Can reach ;behind back;to shoulder level;overhead;out to the side;across body   Performance level with pain   STG Target Performance Reach ;out to the side;overhead;across body;to counter height;behind back   Performance level with improved motion, < 2/10 pain   Rationale for bathing;for dressing;for independent personal hygiene;for meal preparation;for care of infant/toddler;for job requirements in their work place;for safe driving, hook seat belt, reach shift lever and turn signal, using both hands on steering wheel   Due date 03/02/23   If goal not met, Why? date extended   LTG Target Performance Unrestricted reaching   Performance Level pain free   Rationale for safe driving, hook seat belt, reach shift lever and turn signal, using both hands on steering wheel;for job requirements in their work place;for care of infant/toddler;for bathing;for meal preparation;for dressing;for independent personal hygiene   Due date 04/13/23   If goal not met, Why? date extended   Goal #2   Goal #2 lifting/carrying   Previous Functional Level No restrictions   Current Functional Level Can lift;an item overhead weighing   Performance level < 5 pounds due to pain    STG Target Performance Lift an item overhead weighing   Performance level > 5 pounds with less than 2/10 pain   Rationale for housework such as laundry, emptying garbage, use of ;for meal preparation;for grocery shopping;for safe care of infant/toddler;for yard work such as shoveling snow;to perform job duties at work   Due date 03/02/23   If goal not met, Why? date extended   LTG Target Performance Lift an item overhead weighing   Performance Level unrestricted pain free   Rationale for housework such as laundry, emptying garbage, use of ;for yard work such as shoveling snow;to perform job duties at work;for meal preparation;for grocery shopping;for safe care of infant/toddler   Due date 04/13/23   If goal not met, Why? date extended         Therapy Frequency:  2 x month  Predicted Duration of Therapy Intervention:  3 months    Hebert Morgan, PT                 I CERTIFY THE NEED FOR THESE SERVICES FURNISHED UNDER        THIS PLAN OF TREATMENT AND WHILE UNDER MY CARE     (Physician attestation of this document indicates review and certification of the therapy plan).                     Certification Date From:  02/02/23   Certification Date To:  05/02/23    Referring Provider:  Albert Yeo    Initial Assessment        See Epic Evaluation SOC Date: 09/09/22

## 2023-02-02 NOTE — PROGRESS NOTES
PROGRESS  REPORT    Progress reporting period is from 9/9/22 to 2/2/23.       SUBJECTIVE  Subjective changes noted by patient:   Subjective: Krystian reports that he had to cancel some appts for a while and felt like he was getting some pinched nerve symptoms in the neck/right upper trap. He's been working on his posture and changing up with mousing with the right arm the past two weeks and he is quite a bit better. Shoulder now feels a bit better since the postural changes.    Current pain level is 2/10  .     Previous pain level was  2/10 Initial Pain level: 2/10.   Changes in function:  Yes (See Goal flowsheet attached for changes in current functional level)  Adverse reaction to treatment or activity: None    OBJECTIVE  Changes noted in objective findings:  Yes, plateau in ROM, strength due to exacerbation  Objective: Cervical AROM WFL all planes pain free Shoulder AROM: Flexion 175, ABduction 175*(slight), ER 70*(Slight), IR T7. MMT: ER 5-/5*, IR 5/5, Flexion 5-/5*, Abduction 4+/5*. PROM: Flexion 165*, ER in 90 abduction 50*, IR in 90 abduction 45*, no tenderness to palpation     ASSESSMENT/PLAN  Updated problem list and treatment plan: Diagnosis 1:  Chronic right shoulder pain, impingment  Pain -  self management, education and home program  Decreased ROM/flexibility - manual therapy and therapeutic exercise  Decreased joint mobility - manual therapy and therapeutic exercise  Decreased strength - therapeutic exercise and therapeutic activities  Impaired muscle performance - neuro re-education  Decreased function - therapeutic activities  STG/LTGs have been met or progress has been made towards goals:  Yes (See Goal flow sheet completed today.)  Assessment of Progress: The patient's condition is improving.  The patient's progress has plateaued.  The patient has had set backs in their progress.  The patient's condition has exacerbated.  Self Management Plans:  Patient has been instructed in a home treatment  program.  Patient  has been instructed in self management of symptoms.  I have re-evaluated this patient and find that the nature, scope, duration and intensity of the therapy is appropriate for the medical condition of the patient.  Krystian continues to require the following intervention to meet STG and LTG's:  PT    Recommendations:  This patient would benefit from continued therapy.     Frequency:  2 X a month, once daily  Duration:  for 3 months        Please refer to the daily flowsheet for treatment today, total treatment time and time spent performing 1:1 timed codes.    Inquires  Hebert Morgan PT, DPT, CSCS  Physical Therapist  [facility]  [address] [facility], [address] [address] [phone]

## 2023-02-20 ENCOUNTER — THERAPY VISIT (OUTPATIENT)
Dept: PHYSICAL THERAPY | Facility: CLINIC | Age: 63
End: 2023-02-20
Payer: COMMERCIAL

## 2023-02-20 DIAGNOSIS — M25.511 ACUTE PAIN OF RIGHT SHOULDER: Primary | ICD-10-CM

## 2023-02-20 PROCEDURE — 97110 THERAPEUTIC EXERCISES: CPT | Mod: GP | Performed by: PHYSICAL THERAPIST

## 2023-02-21 ENCOUNTER — LAB (OUTPATIENT)
Dept: LAB | Facility: CLINIC | Age: 63
End: 2023-02-21
Payer: COMMERCIAL

## 2023-02-21 DIAGNOSIS — E78.5 HYPERLIPIDEMIA LDL GOAL <100: ICD-10-CM

## 2023-02-21 DIAGNOSIS — Z12.5 SCREENING FOR PROSTATE CANCER: ICD-10-CM

## 2023-02-21 LAB
ALBUMIN SERPL BCG-MCNC: 4.9 G/DL (ref 3.5–5.2)
ALP SERPL-CCNC: 73 U/L (ref 40–129)
ALT SERPL W P-5'-P-CCNC: 44 U/L (ref 10–50)
ANION GAP SERPL CALCULATED.3IONS-SCNC: 13 MMOL/L (ref 7–15)
AST SERPL W P-5'-P-CCNC: 33 U/L (ref 10–50)
BILIRUB SERPL-MCNC: 1 MG/DL
BUN SERPL-MCNC: 17.9 MG/DL (ref 8–23)
CALCIUM SERPL-MCNC: 10.2 MG/DL (ref 8.8–10.2)
CHLORIDE SERPL-SCNC: 104 MMOL/L (ref 98–107)
CHOLEST SERPL-MCNC: 184 MG/DL
CREAT SERPL-MCNC: 1.02 MG/DL (ref 0.67–1.17)
DEPRECATED HCO3 PLAS-SCNC: 27 MMOL/L (ref 22–29)
GFR SERPL CREATININE-BSD FRML MDRD: 84 ML/MIN/1.73M2
GLUCOSE SERPL-MCNC: 106 MG/DL (ref 70–99)
HDLC SERPL-MCNC: 47 MG/DL
LDLC SERPL CALC-MCNC: 102 MG/DL
NONHDLC SERPL-MCNC: 137 MG/DL
POTASSIUM SERPL-SCNC: 4.9 MMOL/L (ref 3.4–5.3)
PROT SERPL-MCNC: 7.5 G/DL (ref 6.4–8.3)
PSA SERPL-MCNC: 2.21 NG/ML (ref 0–4.5)
SODIUM SERPL-SCNC: 144 MMOL/L (ref 136–145)
TRIGL SERPL-MCNC: 173 MG/DL

## 2023-02-21 PROCEDURE — 80053 COMPREHEN METABOLIC PANEL: CPT

## 2023-02-21 PROCEDURE — 80061 LIPID PANEL: CPT

## 2023-02-21 PROCEDURE — G0103 PSA SCREENING: HCPCS

## 2023-02-21 PROCEDURE — 36415 COLL VENOUS BLD VENIPUNCTURE: CPT

## 2023-03-01 ASSESSMENT — ENCOUNTER SYMPTOMS
EYE PAIN: 0
CONSTIPATION: 0
DIZZINESS: 0
COUGH: 0
DYSURIA: 0
HEMATOCHEZIA: 0
ABDOMINAL PAIN: 0
WEAKNESS: 0
HEADACHES: 0
PALPITATIONS: 0
HEMATURIA: 0
MYALGIAS: 0
HEARTBURN: 0
FEVER: 0
NAUSEA: 0
JOINT SWELLING: 0
SHORTNESS OF BREATH: 0
ARTHRALGIAS: 0
SORE THROAT: 0
FREQUENCY: 0
PARESTHESIAS: 0
NERVOUS/ANXIOUS: 1
DIARRHEA: 0
CHILLS: 0

## 2023-03-02 ENCOUNTER — OFFICE VISIT (OUTPATIENT)
Dept: INTERNAL MEDICINE | Facility: CLINIC | Age: 63
End: 2023-03-02
Payer: COMMERCIAL

## 2023-03-02 VITALS
SYSTOLIC BLOOD PRESSURE: 124 MMHG | DIASTOLIC BLOOD PRESSURE: 82 MMHG | HEART RATE: 76 BPM | OXYGEN SATURATION: 95 % | BODY MASS INDEX: 24.12 KG/M2 | HEIGHT: 72 IN | WEIGHT: 178.1 LBS

## 2023-03-02 DIAGNOSIS — E78.5 HYPERLIPIDEMIA LDL GOAL <100: ICD-10-CM

## 2023-03-02 DIAGNOSIS — R04.0 EPISTAXIS: ICD-10-CM

## 2023-03-02 DIAGNOSIS — F41.9 ANXIETY: ICD-10-CM

## 2023-03-02 DIAGNOSIS — Z00.01 ENCOUNTER FOR ROUTINE ADULT MEDICAL EXAM WITH ABNORMAL FINDINGS: Primary | ICD-10-CM

## 2023-03-02 DIAGNOSIS — R73.9 BLOOD GLUCOSE ELEVATED: ICD-10-CM

## 2023-03-02 DIAGNOSIS — Z12.5 SCREENING FOR PROSTATE CANCER: ICD-10-CM

## 2023-03-02 PROBLEM — M25.511 ACUTE PAIN OF RIGHT SHOULDER: Status: RESOLVED | Noted: 2022-09-09 | Resolved: 2023-03-02

## 2023-03-02 PROCEDURE — 99214 OFFICE O/P EST MOD 30 MIN: CPT | Mod: 25 | Performed by: INTERNAL MEDICINE

## 2023-03-02 PROCEDURE — 99396 PREV VISIT EST AGE 40-64: CPT | Performed by: INTERNAL MEDICINE

## 2023-03-02 RX ORDER — ATORVASTATIN CALCIUM 40 MG/1
40 TABLET, FILM COATED ORAL DAILY
Qty: 90 TABLET | Refills: 3 | Status: SHIPPED | OUTPATIENT
Start: 2023-03-02 | End: 2024-04-29

## 2023-03-02 RX ORDER — PROPRANOLOL HYDROCHLORIDE 10 MG/1
10 TABLET ORAL 3 TIMES DAILY PRN
Qty: 60 TABLET | Refills: 2 | Status: SHIPPED | OUTPATIENT
Start: 2023-03-02

## 2023-03-02 ASSESSMENT — ENCOUNTER SYMPTOMS
HEMATOCHEZIA: 0
DIARRHEA: 0
WEAKNESS: 0
NAUSEA: 0
NERVOUS/ANXIOUS: 1
CONSTIPATION: 0
HEARTBURN: 0
HEMATURIA: 0
MYALGIAS: 0
CHILLS: 0
PALPITATIONS: 0
JOINT SWELLING: 0
FREQUENCY: 0
DYSURIA: 0
ARTHRALGIAS: 0
SHORTNESS OF BREATH: 0
HEADACHES: 0
FEVER: 0
DIZZINESS: 0
PARESTHESIAS: 0
SORE THROAT: 0
EYE PAIN: 0
ABDOMINAL PAIN: 0
COUGH: 0

## 2023-03-02 ASSESSMENT — ANXIETY QUESTIONNAIRES
1. FEELING NERVOUS, ANXIOUS, OR ON EDGE: SEVERAL DAYS
GAD7 TOTAL SCORE: 1
2. NOT BEING ABLE TO STOP OR CONTROL WORRYING: NOT AT ALL
IF YOU CHECKED OFF ANY PROBLEMS ON THIS QUESTIONNAIRE, HOW DIFFICULT HAVE THESE PROBLEMS MADE IT FOR YOU TO DO YOUR WORK, TAKE CARE OF THINGS AT HOME, OR GET ALONG WITH OTHER PEOPLE: NOT DIFFICULT AT ALL
6. BECOMING EASILY ANNOYED OR IRRITABLE: NOT AT ALL
GAD7 TOTAL SCORE: 1
7. FEELING AFRAID AS IF SOMETHING AWFUL MIGHT HAPPEN: NOT AT ALL
3. WORRYING TOO MUCH ABOUT DIFFERENT THINGS: NOT AT ALL
5. BEING SO RESTLESS THAT IT IS HARD TO SIT STILL: NOT AT ALL

## 2023-03-02 ASSESSMENT — PATIENT HEALTH QUESTIONNAIRE - PHQ9: 5. POOR APPETITE OR OVEREATING: NOT AT ALL

## 2023-03-02 NOTE — PROGRESS NOTES
SUBJECTIVE:   CC: Krystian is an 62 year old who presents for preventative health visit and follow-up regarding hyperlipidemia and discussion regarding other medical issues as below     Patient has been advised of split billing requirements and indicates understanding: Yes     Healthy Habits:     Getting at least 3 servings of Calcium per day:  Yes    Bi-annual eye exam:  Yes    Dental care twice a year:  Yes    Sleep apnea or symptoms of sleep apnea:  None    Diet:  Regular (no restrictions)    Frequency of exercise:  4-5 days/week    Duration of exercise:  15-30 minutes    Taking medications regularly:  Yes    Barriers to taking medications:  None    Medication side effects:  None    PHQ-2 Total Score: 0    Additional concerns today:  Yes        Today's PHQ-2 Score:   PHQ-2 ( 1999 Pfizer) 3/1/2023   Q1: Little interest or pleasure in doing things 0   Q2: Feeling down, depressed or hopeless 0   PHQ-2 Score 0   PHQ-2 Total Score (12-17 Years)- Positive if 3 or more points; Administer PHQ-A if positive -   Q1: Little interest or pleasure in doing things Not at all   Q2: Feeling down, depressed or hopeless Not at all   PHQ-2 Score 0       Have you ever done Advance Care Planning? (For example, a Health Directive, POLST, or a discussion with a medical provider or your loved ones about your wishes): No, advance care planning information given to patient to review.  Patient declined advance care planning discussion at this time.    Social History     Tobacco Use     Smoking status: Never     Smokeless tobacco: Never   Substance Use Topics     Alcohol use: Yes     Comment: 2 or 3 drinks a week          Alcohol Use 3/1/2023   Prescreen: >3 drinks/day or >7 drinks/week? No        Last PSA:   PSA   Date Value Ref Range Status   08/27/2019 1.96 0 - 4 ug/L Final     Comment:     Assay Method:  Chemiluminescence using Siemens Vista analyzer     Prostate Specific Antigen Screen   Date Value Ref Range Status   02/21/2023 2.21 0.00 -  4.50 ng/mL Final       Reviewed orders with patient. Reviewed health maintenance and updated orders accordingly - Yes  Labs reviewed in Epic    Component      Latest Ref Rng & Units 8/27/2019 1/29/2021 11/9/2021 2/21/2023   Sodium      136 - 145 mmol/L    144   Potassium      3.4 - 5.3 mmol/L    4.9   Chloride      98 - 107 mmol/L    104   Carbon Dioxide (CO2)      22 - 29 mmol/L    27   Anion Gap      7 - 15 mmol/L    13   Urea Nitrogen      8.0 - 23.0 mg/dL    17.9   Creatinine      0.67 - 1.17 mg/dL    1.02   Calcium      8.8 - 10.2 mg/dL    10.2   Glucose      70 - 99 mg/dL    106 (H)   Alkaline Phosphatase      40 - 129 U/L   70 73   AST      10 - 50 U/L   25 33   ALT      10 - 50 U/L   51 44   Protein Total      6.4 - 8.3 g/dL   7.7 7.5   Albumin      3.5 - 5.2 g/dL    4.9   Bilirubin Total      <=1.2 mg/dL   0.7 1.0   GFR Estimate      >60 mL/min/1.73m2    84   Bilirubin Direct      0.0 - 0.2 mg/dL   0.2    Albumin      3.4 - 5.0 g/dL   4.3    Cholesterol      <200 mg/dL  295 (H) 172 184   Triglycerides      <150 mg/dL  168 (H) 99 173 (H)   HDL Cholesterol      >=40 mg/dL  46 45 47   LDL Cholesterol Calculated      <=100 mg/dL  215 (H) 107 (H) 102 (H)   Non HDL Cholesterol      <130 mg/dL  249 (H) 127 137 (H)   Patient Fasting > 8hrs?         Yes    PSA      0 - 4 ug/L 1.96      CK Total      30 - 300 U/L   81    PSA Tumor Marker      0.00 - 4.50 ng/mL    2.21       Reviewed and updated as needed this visit by clinical staff                  Reviewed and updated as needed this visit by Provider                     Review of Systems   Constitutional: Negative for chills and fever.   HENT: Negative for congestion, ear pain, hearing loss and sore throat.    Eyes: Negative for pain and visual disturbance.   Respiratory: Negative for cough and shortness of breath.    Cardiovascular: Negative for chest pain, palpitations and peripheral edema.   Gastrointestinal: Negative for abdominal pain, constipation, diarrhea,  "heartburn, hematochezia and nausea.   Genitourinary: Negative for dysuria, frequency, genital sores, hematuria, impotence, penile discharge and urgency.   Musculoskeletal: Negative for arthralgias, joint swelling and myalgias.   Skin: Negative for rash.   Neurological: Negative for dizziness, weakness, headaches and paresthesias.   Psychiatric/Behavioral: Negative for mood changes. The patient is nervous/anxious.       Nose bleeds left side x2  In the past 1 month. Daughter and brother also have hx. No oral bleeding. No bruising.   Has to use a clamp when occurring  Only using Tylenol for occ pain control  Rare numbness in ball of feet.  Seeing chiropractor often. No back pain now. No acute changes. No sx in legs. 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 Previous use of Citalopram but stopped when improved    OBJECTIVE:   /82   Pulse 76   Ht 1.826 m (5' 11.9\")   Wt 80.8 kg (178 lb 1.6 oz)   SpO2 95%   BMI 24.22 kg/m      Physical Exam  General appearance - healthy, alert, no distress  Skin - No rashes or lesions.  Head - normocephalic, atraumatic  Eyes - PRIYANKA, EOMI, fundi exam with nondilated pupils negative.  Ears - External ears normal. Canals clear. TM's normal.  Nose/Sinuses - Nares normal. Septum midline. Mucosa normal. No drainage or sinus tenderness.  No blood/scab seen in left nares  Oropharynx - No erythema, no adenopathy, no exudates.  Neck - Supple without adenopathy or thyromegaly. No bruits.  Lungs - Clear to auscultation without wheezes/rhonchi.  Heart - Regular rate and rhythm without murmurs, clicks, or gallops.  Nodes - No supraclavicular, axillary, or inguinal adenopathy palpable.  Abdomen - Abdomen soft, non-tender. BS normal. No masses or hepatosplenomegaly palpable. No bruits.  Extremities -No " cyanosis, clubbing or edema.    Musculoskeletal - Spine ROM normal. Muscular strength intact.   Peripheral pulses - radial=4/4, femoral=4/4, posterior tibial=4/4, dorsalis pedis=4/4,  Neuro - Gait normal. Reflexes normal and symmetric. Sensation grossly WNL.  Genital - Normal-appearing male external genitalia. No scrotal masses or inguinal hernia palpable.   Rectal - Guaic negative stool. Normal tone. Prostate normal in size to palpation. No rectal masses or prostate nodularity palpable      ASSESSMENT/PLAN:   1. Encounter for routine adult medical exam with abnormal findings  Patient declines COVID or influenza vaccinations despite MD recommendation.  Discussed Shingrix option and patient will consider.  Screening labs as ordered  - Comprehensive metabolic panel; Future  - CBC with platelets; Future    2. Hyperlipidemia LDL goal <100  On statin therapy.  Lipids remain mildly elevated.  Patient feels there is room to improve diet.  Will continue same statin dose and repeat labs 1 year  - Comprehensive metabolic panel; Future  - Lipid panel reflex to direct LDL Fasting; Future  - atorvastatin (LIPITOR) 40 MG tablet; Take 1 tablet (40 mg) by mouth daily  Dispense: 90 tablet; Refill: 3    3. Anxiety  Has a overall underlying anxiety but current GAD7 only = 1.  Mostly situational with performing trumpet playing. Will try prn Propranolol. If sx worsen and more frequent, discussed possible restarting daily Citalopram vs counseling/therapy  - propranolol (INDERAL) 10 MG tablet; Take 1 tablet (10 mg) by mouth 3 times daily as needed (anxiety)  Dispense: 60 tablet; Refill: 2    4. Epistaxis  Intermittent severe left nosebleed.  Currently asymptomatic.  Denies NSAID use.  Patient to see ENT for possible cauterization  - Adult ENT  Referral; Future    5. Blood glucose elevated  Mild glucose elevation.  Counseled regarding carbohydrate reduction.  Repeat labs 1 year  - Comprehensive metabolic panel; Future  -  Hemoglobin A1c; Future    6. Screening for prostate cancer  Candidate for PSA/prostate cancer screening.  Negative TAWANNA today  - Prostate Specific Antigen Screen; Future      Patient has been advised of split billing requirements and indicates understanding: Yes      COUNSELING:   Reviewed preventive health counseling, as reflected in patient instructions        He reports that he has never smoked. He has never used smokeless tobacco.        PLAN:  Continue current medications  Prescriptions refilled.    Propanolol 10 mg tablet, 1 tablet up to 3 times a day as needed for anxiety.  If having anxiety with coming performance, take the medication 1 to 2 hours prior to the performance.  Possible future use of citalopram daily for prevention of anxiety and/or referral for counseling/therapy  Call  [phone] or use [facility] [employer] [facility] to schedule a future lab appointment  fasting in 1 year.   For fasting labs, please refrain from eating for 8 hours or more.   Drink 2 glasses of water before your lab appointment. It is fine to take your  oral medications on the morning of the lab test as usual  Schedule a follow up appointment with me in clinic a few days after these future labs are drawn to review results and other medical issues as necessary  Reduce calorie/carbohydrate (sugar, bread, potato, pasta, rice, alcohol etc)  intake in diet.  Increase color on your plate with vegetables. Increase  frequency of walking or other aerobic exercise as able (goal is daily)  Referral to ENT re: nose bleeds  Schedulers will contact you to schedule an appointment.  M If you don t hear from a representative within 2 business days, please call [phone].  Check with insurance or speak with your pharmacist re: Shingrix vaccine coverage for shingles prevention.  This is a 2 shot series done 2-6 months apart  Pt was informed regarding extra E&M billing for management of new or established medical issues not related to today's wellness/screening  visit      Richard Purcell MD  [facility]

## 2023-03-02 NOTE — PATIENT INSTRUCTIONS
Continue current medications  Prescriptions refilled.    Propanolol 10 mg tablet, 1 tablet up to 3 times a day as needed for anxiety.  If having anxiety with coming performance, take the medication 1 to 2 hours prior to the performance.  Possible future use of citalopram daily for prevention of anxiety and/or referral for counseling/therapy  Call  [phone] or use [employer] to schedule a future lab appointment  fasting in 1 year.   For fasting labs, please refrain from eating for 8 hours or more.   Drink 2 glasses of water before your lab appointment. It is fine to take your  oral medications on the morning of the lab test as usual  Schedule a follow up appointment with me in clinic a few days after these future labs are drawn to review results and other medical issues as necessary  Reduce calorie/carbohydrate (sugar, bread, potato, pasta, rice, alcohol etc)  intake in diet.  Increase color on your plate with vegetables. Increase  frequency of walking or other aerobic exercise as able (goal is daily)  Referral to ENT re: nose bleeds  Schedulers will contact you to schedule an appointment.  M If you don t hear from a representative within 2 business days, please call [phone].  Check with insurance or speak with your pharmacist re: Shingrix vaccine coverage for shingles prevention.  This is a 2 shot series done 2-6 months apart  Pt was informed regarding extra E&M billing for management of new or established medical issues not related to today's wellness/screening visit

## 2023-03-20 ENCOUNTER — THERAPY VISIT (OUTPATIENT)
Dept: PHYSICAL THERAPY | Facility: CLINIC | Age: 63
End: 2023-03-20
Payer: COMMERCIAL

## 2023-03-20 DIAGNOSIS — M25.511 ACUTE PAIN OF RIGHT SHOULDER: Primary | ICD-10-CM

## 2023-03-20 DIAGNOSIS — M75.41 IMPINGEMENT SYNDROME OF RIGHT SHOULDER: ICD-10-CM

## 2023-03-20 PROCEDURE — 97110 THERAPEUTIC EXERCISES: CPT | Mod: GP | Performed by: PHYSICAL THERAPIST

## 2023-04-06 ENCOUNTER — TRANSFERRED RECORDS (OUTPATIENT)
Dept: HEALTH INFORMATION MANAGEMENT | Facility: CLINIC | Age: 63
End: 2023-04-06
Payer: COMMERCIAL

## 2023-04-20 ENCOUNTER — MYC MEDICAL ADVICE (OUTPATIENT)
Dept: INTERNAL MEDICINE | Facility: CLINIC | Age: 63
End: 2023-04-20
Payer: COMMERCIAL

## 2023-04-21 ENCOUNTER — NURSE TRIAGE (OUTPATIENT)
Dept: INTERNAL MEDICINE | Facility: CLINIC | Age: 63
End: 2023-04-21
Payer: COMMERCIAL

## 2023-04-21 DIAGNOSIS — U07.1 INFECTION DUE TO 2019 NOVEL CORONAVIRUS: Primary | ICD-10-CM

## 2023-04-21 NOTE — TELEPHONE ENCOUNTER
RN COVID TREATMENT VISIT  04/21/23      The patient has been triaged and does not require a higher level of care.    Krystian Akins  62 year old  Current weight? 178    Has the patient been seen by a primary care provider at an [facility] or [facility] within the past two years? Yes.   Have you been in close proximity to/do you have a known exposure to a person with a confirmed case of influenza? No.     General treatment eligibility:  Date of positive COVID test (PCR or at home)?  4/19    Are you or have you been hospitalized for this COVID-19 infection? No.   Have you received monoclonal antibodies or antiviral treatment for COVID-19 since this positive test? No.   Do you have any of the following conditions that place you at risk of being very sick from COVID-19?   - Age 50 years or older  Yes, patient has at least one high risk condition as noted above.     Current COVID symptoms:   - fever or chills  - cough  - shortness of breath or difficulty breathing  - headache  - sore throat  - congestion or runny nose  Yes. Patient has at least one symptom as selected.     How many days since symptoms started? 5 days or less. Established patient, 12 years or older weighing at least 88.2 lbs, who has symptoms that started in the past 5 days, has not been hospitalized nor received treatment already, and is at risk for being very sick from COVID-19.     Treatment eligibility by RN:    Are you currently pregnant or nursing? No    Do you have a clinically significant hypersensitivity to nirmatrelvir or ritonavir, or toxic epidermal necrolysis (TEN) or Shepherd-Virgil Syndrome? No    Do you have a history of hepatitis, any hepatic impairment on the Problem List (such as Child-Dowd Class C, cirrhosis, fatty liver disease, alcoholic liver disease), or was the last liver lab (hepatic panel, ALT, AST, ALK Phos, bilirubin) elevated in the past 6 months? No    Do you have any history of severe renal  impairment (eGFR < 30mL/min)? No    Is patient eligible to continue?   Yes, patient meets all eligibility requirements for the RN COVID treatment (as denoted by all no responses above).     Current Outpatient Medications   Medication Sig Dispense Refill     atorvastatin (LIPITOR) 40 MG tablet Take 1 tablet (40 mg) by mouth daily 90 tablet 3     propranolol (INDERAL) 10 MG tablet Take 1 tablet (10 mg) by mouth 3 times daily as needed (anxiety) 60 tablet 2       Medications from List 1 of the standing order (on medications that exclude the use of Paxlovid) that patient is taking: NONE. Is patient taking Chualar's Wort? No  Is patient taking Lex's Wort or any meds from List 1? No.   Medications from List 2 of the standing order (on meds that provider needs to adjust) that patient is taking: NONE. Is patient on any of the meds from List 2? No.   Medications from List 3 of standing order (on meds that a RN needs to adjust) that patient is taking: atorvastatin (Lipitor): Instructed patient to stop atorvastatin while taking Paxlovid and restart atorvastatin 1 day after the completion of Paxlovid.  Is patient on any meds from List 3? Yes. Patient is on meds from list 3. No meds require a provider visit and at least one med required RN to adjust.     Paxlovid has an approximate 90% reduction in hospitalization. Paxlovid can possibly cause altered sense of taste, diarrhea (loose, watery stools), high blood pressure, muscle aches.     Would patient like a Paxlovid prescription?   Yes.   Lab Results   Component Value Date    GFRESTIMATED 84 02/21/2023       Was last eGFR reduced? No, eGFR 60 or greater/ No Result on record. Patient can receive the normal renal function dose. Paxlovid Rx sent to [place] pharmacy   [facility]    Temporary change to home medications: Atorvastatin will be on hold    All medication adjustments (holds, etc) were discussed with the patient and patient was asked to repeat back (teachback) their med  adjustment.  Did patient understand med adjustment? Yes, patient repeated back and understood correctly.        Reviewed the following instructions with the patient:    Paxlovid (nimatrelvir and ritonavir)    How it works  Two medicines (nirmatrelvir and ritonavir) are taken together. They stop the virus from growing. Less amount of virus is easier for your body to fight.    How to take    Medicine comes in a daily container with both medicine tablets. Take by mouth twice daily (once in the morning, once at night) for 5 days.    The number of tablets to take varies by patient.    Don't chew or break capsules. Swallow whole.    When to take  Take as soon as possible after positive COVID-19 test result, and within 5 days of your first symptoms.    Possible side effects  Can cause altered sense of taste, diarrhea (loose, watery stools), high blood pressure, muscle aches.    Doug Tejada RN               Reason for Disposition    [1] HIGH RISK for severe COVID complications (e.g., weak immune system, age > 64 years, obesity with BMI > 25, pregnant, chronic lung disease or other chronic medical condition) AND [2] COVID symptoms (e.g., cough, fever)  (Exceptions: Already seen by PCP and no new or worsening symptoms.)    Additional Information    Negative: SEVERE difficulty breathing (e.g., struggling for each breath, speaks in single words)    Negative: Difficult to awaken or acting confused (e.g., disoriented, slurred speech)    Negative: Bluish (or gray) lips or face now    Negative: Shock suspected (e.g., cold/pale/clammy skin, too weak to stand, low BP, rapid pulse)    Negative: Sounds like a life-threatening emergency to the triager    Negative: SEVERE or constant chest pain or pressure  (Exception: Mild central chest pain, present only when coughing.)    Negative: MODERATE difficulty breathing (e.g., speaks in phrases, SOB even at rest, pulse 100-120)    Negative: [1] Headache AND [2] stiff neck (can't touch chin to  chest)    Negative: Oxygen level (e.g., pulse oximetry) 90 percent or lower    Negative: MILD difficulty breathing (e.g., minimal/no SOB at rest, SOB with walking, pulse <100)    Negative: Fever > 103 F (39.4 C)    Negative: [1] Fever > 101 F (38.3 C) AND [2] age > 60 years    Negative: [1] Fever > 100.0 F (37.8 C) AND [2] bedridden (e.g., nursing home patient, CVA, chronic illness, recovering from surgery)    Protocols used: CORONAVIRUS (COVID-19) DIAGNOSED OR [id number]

## 2023-05-01 NOTE — PROGRESS NOTES
DISCHARGE SUMMARY    Krystian Akins was seen 6 times for evaluation and treatment.  Patient did not return for further treatment and current status is unknown.  Due to short treatment duration, no objective or functional changes were made.  Please see goal flow sheet from episode noted date below and initial evaluation for further information.  Patient is discharged from therapy and therapy episode is resolved as of 05/01/23.      Linked Episodes   Type: Episode: Status: Noted: Resolved: Last update: Updated by:   PHYSICAL THERAPY Right Shoulder 9/9/22 Active 9/9/2022  3/20/2023 10:45 AM Hebert Morgan, PT      Comments:

## 2023-08-29 ENCOUNTER — OFFICE VISIT (OUTPATIENT)
Dept: DERMATOLOGY | Facility: CLINIC | Age: 63
End: 2023-08-29
Payer: COMMERCIAL

## 2023-08-29 DIAGNOSIS — D22.9 NEVUS: Primary | ICD-10-CM

## 2023-08-29 DIAGNOSIS — L82.0 INFLAMED SEBORRHEIC KERATOSIS: ICD-10-CM

## 2023-08-29 DIAGNOSIS — D18.01 ANGIOMA OF SKIN: ICD-10-CM

## 2023-08-29 DIAGNOSIS — L82.1 SEBORRHEIC KERATOSIS: ICD-10-CM

## 2023-08-29 DIAGNOSIS — L81.4 LENTIGO: ICD-10-CM

## 2023-08-29 PROCEDURE — 99213 OFFICE O/P EST LOW 20 MIN: CPT | Mod: 25 | Performed by: PHYSICIAN ASSISTANT

## 2023-08-29 PROCEDURE — 17110 DESTRUCTION B9 LES UP TO 14: CPT | Performed by: PHYSICIAN ASSISTANT

## 2023-08-29 NOTE — PROGRESS NOTES
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 Has 3 children who are grown and are special needs.   On review of systems, there are no further skin complaints, patient is feeling otherwise well.   ROS of the following were done and are negative: Constitutional, Eyes, Ears, Nose,   Mouth, Throat, Cardiovascular, Respiratory, GI, Genitourinary, Musculoskeletal,   Psychiatric, Endocrine, Allergic/Immunologic.    PHYSICAL EXAM:   There were no vitals taken for this visit.  Skin exam performed as follows: Type 2 skin. Mood appropriate  Alert and Oriented X 3. Well developed, well nourished in no distress.  General appearance: Normal  Head including face: Normal  Eyes: conjunctiva and lids: Normal  Mouth: Lips, teeth, gums: Normal  Neck: Normal  Chest-breast/axillae: Normal  Back: Normal  Extremities: digits/nails (clubbing): Normal  Eccrine and Apocrine glands: Normal  Right upper extremity: Normal  Left upper extremity: Normal  Right lower extremity: Normal  Left lower extremity: Normal  Skin: Scalp and body hair: See below    Pt deferred exam of breasts, groin, buttocks: No    Other physical findings:  1. Multiple pigmented macules on extremities and trunk  2. Multiple pigmented macules on face, trunk and extremities  3. Multiple vascular papules on trunk, arms and legs  4. Multiple scattered keratotic plaques  5. Inflamed keratotic papule on the left cheek x  1       Except as noted above, no other signs of skin cancer or melanoma.     ASSESSMENT/PLAN:   Benign Full skin cancer screening today. . Patient with history of a severely atypical nevus; fhx of melanoma  Advised on monthly self exams and 1 year  Patient Education: Appropriate brochures given.    Multiple benign appearing melanocytic nevi on arms, legs and trunk. Discussed ABCDEs of melanoma and sunscreen.   Multiple lentigos on arms, legs and trunk. Advised benign, no treatment needed.  Multiple scattered angiomas. Advised benign, no treatment needed.   Seborrheic keratosis on arms, legs and trunk. Advised benign, no treatment needed.  Inflamed seborrheic keratosis on the left cheek x 1. As physically tender cryosurgery performed. Advised on post op care.             Follow-up: yearly/PRN sooner    1.) Patient was asked about new and changing moles. YES  2.) Patient received a complete physical skin examination: YES  3.) Patient was counseled to perform a monthly self skin examination: YES  Scribed By: Monika Hui, MS, PAJUDY

## 2023-08-29 NOTE — LETTER
8/29/2023         RE: Krystian Akins  [address]  [place] [address]        Dear Colleague,    Thank you for referring your patient, Krystian Akins, to the [facility]. Please see a copy of my visit note below.    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 Has 3 children who are grown and are special needs.   On review of systems, there are no further skin complaints, patient is feeling otherwise well.   ROS of the following were done and are negative: Constitutional, Eyes, Ears, Nose,   Mouth, Throat, Cardiovascular, Respiratory, GI, Genitourinary, Musculoskeletal,   Psychiatric, Endocrine, Allergic/Immunologic.    PHYSICAL EXAM:   There were no vitals taken for this visit.  Skin exam performed as follows: Type 2 skin. Mood appropriate  Alert and Oriented X 3. Well developed, well nourished in no distress.  General appearance: Normal  Head including face: Normal  Eyes: conjunctiva and lids: Normal  Mouth: Lips, teeth, gums: Normal  Neck: Normal  Chest-breast/axillae: Normal  Back: Normal  Extremities: digits/nails (clubbing): Normal  Eccrine and Apocrine glands: Normal  Right upper extremity: Normal  Left upper extremity: Normal  Right lower extremity: Normal  Left lower extremity: Normal  Skin: Scalp and body hair: See below    Pt deferred exam of breasts, groin, buttocks: No    Other physical  findings:  1. Multiple pigmented macules on extremities and trunk  2. Multiple pigmented macules on face, trunk and extremities  3. Multiple vascular papules on trunk, arms and legs  4. Multiple scattered keratotic plaques  5. Inflamed keratotic papule on the left cheek x 1       Except as noted above, no other signs of skin cancer or melanoma.     ASSESSMENT/PLAN:   Benign Full skin cancer screening today. . Patient with history of a severely atypical nevus; fhx of melanoma  Advised on monthly self exams and 1 year  Patient Education: Appropriate brochures given.    Multiple benign appearing melanocytic nevi on arms, legs and trunk. Discussed ABCDEs of melanoma and sunscreen.   Multiple lentigos on arms, legs and trunk. Advised benign, no treatment needed.  Multiple scattered angiomas. Advised benign, no treatment needed.   Seborrheic keratosis on arms, legs and trunk. Advised benign, no treatment needed.  Inflamed seborrheic keratosis on the left cheek x 1. As physically tender cryosurgery performed. Advised on post op care.             Follow-up: yearly/PRN sooner    1.) Patient was asked about new and changing moles. YES  2.) Patient received a complete physical skin examination: YES  3.) Patient was counseled to perform a monthly self skin examination: YES  Scribed By: Monika Hui, MS, PAYomairaC      Again, thank you for allowing me to participate in the care of your patient.        Sincerely,        Monika Hui PA-C

## 2024-02-01 ENCOUNTER — PATIENT OUTREACH (OUTPATIENT)
Dept: CARE COORDINATION | Facility: CLINIC | Age: 64
End: 2024-02-01
Payer: COMMERCIAL

## 2024-02-15 ENCOUNTER — PATIENT OUTREACH (OUTPATIENT)
Dept: CARE COORDINATION | Facility: CLINIC | Age: 64
End: 2024-02-15
Payer: COMMERCIAL

## 2024-03-04 ENCOUNTER — MYC MEDICAL ADVICE (OUTPATIENT)
Dept: INTERNAL MEDICINE | Facility: CLINIC | Age: 64
End: 2024-03-04
Payer: COMMERCIAL

## 2024-03-04 ENCOUNTER — NURSE TRIAGE (OUTPATIENT)
Dept: INTERNAL MEDICINE | Facility: CLINIC | Age: 64
End: 2024-03-04
Payer: COMMERCIAL

## 2024-03-04 NOTE — TELEPHONE ENCOUNTER
Sx present for a couple years but worsening over past few days per pt. Pt to be seen in clinic this week. I don'  T have openings. Pt to see other IM partner this week either  at [facility] or with other  [facility] provider with opening this week

## 2024-03-04 NOTE — TELEPHONE ENCOUNTER
Krystian WHITTINGTON  Triage Im (supporting Richard Purcell MD)11 hours ago (1:44 AM)     SB  Dr. Purcell,  I've had an ache in my abdomen that has been building over a long period (almost 2 years) that started as what felt like a side ache only when driving with my right hand.  It has become more common but not more painful over time but in the last couple days it seems to be nearly constant and is causing more pain and concern.  It is located just below my rib cage, right of center in my abdomen.     I would like to get it checked out ASAP.  Please advise how I should proceed.     Thanks, Krystian Akins      S-(situation): Abdominal discomfort for approx 2 years that has been getting progressively worse     B-(background): going on for two years with increased discomfort this past two weeks.    A-(assessment): afebrile, no jaundice, no urinary concerns. Only discomfort in abdomen, lower rib area on right side more toward center.     R-(recommendations): Please advise as no appointments available.         Reason for Disposition   MILD pain (e.g., does not interfere with normal activities) and pain comes and goes (cramps) lasts > 48 hours  (Exception: This same abdominal pain is a chronic symptom recurrent or ongoing AND present > 4 weeks.)    Additional Information   Negative: Passed out (i.e., fainted, collapsed and was not responding)   Negative: Shock suspected (e.g., cold/pale/clammy skin, too weak to stand, low BP, rapid pulse)   Negative: Sounds like a life-threatening emergency to the triager   Negative: Followed an abdomen (stomach) injury   Negative: Chest pain   Negative: Pain is mainly in upper abdomen (if needed ask: 'is it mainly above the belly button?')   Negative: Abdomen bloating or swelling are main symptoms   Negative: SEVERE abdominal pain (e.g., excruciating)   Negative: Vomiting red blood or black (coffee ground) material   Negative: Blood in bowel movements  (Exception: Blood on surface of BM with  "constipation.)   Negative: Black or tarry bowel movements  (Exception: Chronic-unchanged black-grey BMs AND is taking iron pills or Pepto-Bismol.)   Negative: Unable to urinate (or only a few drops) and bladder feels very full   Negative: Pain in scrotum persists > 1 hour   Negative: MILD TO MODERATE constant pain lasting > 2 hours   Negative: Vomiting bile (green color)   Negative: Patient sounds very sick or weak to the triager   Negative: Vomiting and abdomen looks much more swollen than usual   Negative: White of the eyes have turned yellow (i.e., jaundice)   Negative: Blood in urine (red, pink, or tea-colored)   Negative: Fever > 103 F (39.4 C)   Negative: Fever > 101 F (38.3 C) and over 60 years of age   Negative: Fever > 100.0 F (37.8 C) and has diabetes mellitus or a weak immune system (e.g., HIV positive, cancer chemotherapy, organ transplant, splenectomy, chronic steroids)   Negative: Fever > 100.0 F (37.8 C) and bedridden (e.g., CVA, chronic illness, recovering from surgery)   Negative: MODERATE pain (e.g., interferes with normal activities) that comes and goes (cramps) lasts > 24 hours  (Exception: Pain with Vomiting or Diarrhea - see that Protocol.)   Negative: Age > 60 years   Negative: Patient wants to be seen    Answer Assessment - Initial Assessment Questions  1. LOCATION: \"Where does it hurt?\"       Below my rib cage but near on right side of center area  2. RADIATION: \"Does the pain shoot anywhere else?\" (e.g., chest, back)      No  3. ONSET: \"When did the pain begin?\" (Minutes, hours or days ago)       Almost two years ago  4. SUDDEN: \"Gradual or sudden onset?\"      gradual  5. PATTERN \"Does the pain come and go, or is it constant?\"     - If it comes and goes: \"How long does it last?\" \"Do you have pain now?\"      (Note: Comes and goes means the pain is intermittent. It goes away completely between bouts.)     - If constant: \"Is it getting better, staying the same, or getting worse?\"       (Note: " "Constant means the pain never goes away completely; most serious pain is constant and gets worse.)       Nearly constant  6. SEVERITY: \"How bad is the pain?\"  (e.g., Scale 1-10; mild, moderate, or severe)     - MILD (1-3): Doesn't interfere with normal activities, abdomen soft and not tender to touch.      - MODERATE (4-7): Interferes with normal activities or awakens from sleep, abdomen tender to touch.      - SEVERE (8-10): Excruciating pain, doubled over, unable to do any normal activities.        More discomfort than anything mild to moderate  7. RECURRENT SYMPTOM: \"Have you ever had this type of stomach pain before?\" If Yes, ask: \"When was the last time?\" and \"What happened that time?\"       No  8. CAUSE: \"What do you think is causing the stomach pain?\"      I don't know  9. RELIEVING/AGGRAVATING FACTORS: \"What makes it better or worse?\" (e.g., antacids, bending or twisting motion, bowel movement)      Sitting and laying makes it more noticable  10. OTHER SYMPTOMS: \"Do you have any other symptoms?\" (e.g., back pain, diarrhea, fever, urination pain, vomiting)        no    Protocols used: Abdominal Pain - Male-A-OH    "

## 2024-03-08 ENCOUNTER — OFFICE VISIT (OUTPATIENT)
Dept: INTERNAL MEDICINE | Facility: CLINIC | Age: 64
End: 2024-03-08
Payer: COMMERCIAL

## 2024-03-08 ENCOUNTER — ANCILLARY PROCEDURE (OUTPATIENT)
Dept: GENERAL RADIOLOGY | Facility: CLINIC | Age: 64
End: 2024-03-08
Attending: PHYSICIAN ASSISTANT
Payer: COMMERCIAL

## 2024-03-08 VITALS
SYSTOLIC BLOOD PRESSURE: 124 MMHG | HEART RATE: 90 BPM | RESPIRATION RATE: 16 BRPM | OXYGEN SATURATION: 97 % | DIASTOLIC BLOOD PRESSURE: 80 MMHG | WEIGHT: 179.8 LBS | HEIGHT: 72 IN | BODY MASS INDEX: 24.35 KG/M2

## 2024-03-08 DIAGNOSIS — R07.89 RIGHT-SIDED CHEST WALL PAIN: ICD-10-CM

## 2024-03-08 DIAGNOSIS — R07.89 RIGHT-SIDED CHEST WALL PAIN: Primary | ICD-10-CM

## 2024-03-08 PROCEDURE — 99213 OFFICE O/P EST LOW 20 MIN: CPT | Performed by: PHYSICIAN ASSISTANT

## 2024-03-08 PROCEDURE — 71101 X-RAY EXAM UNILAT RIBS/CHEST: CPT | Mod: TC | Performed by: RADIOLOGY

## 2024-03-08 ASSESSMENT — ENCOUNTER SYMPTOMS: ABDOMINAL PAIN: 1

## 2024-03-08 NOTE — PROGRESS NOTES
"  Assessment & Plan     Right-sided chest wall pain    - XR Ribs & Chest Right G/E 3 Views; Future              Will notify of results via my chart      Subjective   Krystian is a 63 year old, presenting for the following health issues:  Abdominal Pain    History of Present Illness       Reason for visit:  Gradually increasing pain in upper right abdomen.  Symptom onset:  More than a month  Symptoms include:  Pain similar to a side ache.  Symptom intensity:  Mild  Symptom progression:  Worsening  Had these symptoms before:  Yes  Has tried/received treatment for these symptoms:  No  What makes it worse:  Sitting and laying on my back.  What makes it better:  Not really.    He eats 4 or more servings of fruits and vegetables daily.He consumes 0 sweetened beverage(s) daily.He exercises with enough effort to increase his heart rate 20 to 29 minutes per day.  He exercises with enough effort to increase his heart rate 5 days per week.   He is taking medications regularly.     Feels like the pain is up under the ribs  Felt- like it was a muscle issue   Lumbar support made it worse at one pain     Laying in bed on back   Since last week worse.     Abdominal - no N/V D or stool changes  No change with eating.               Review of Systems  Constitutional, HEENT, cardiovascular, pulmonary, gi systems are negative, except as otherwise noted.      Objective    /80   Pulse 90   Resp 16   Ht 1.826 m (5' 11.9\")   Wt 81.6 kg (179 lb 12.8 oz)   SpO2 97%   BMI 24.45 kg/m    Body mass index is 24.45 kg/m .  Physical Exam   GENERAL: alert and no distress  RESP: lungs clear to auscultation - no rales, rhonchi or wheezes  CV: regular rates and rhythm and normal S1 S2, no S3 or S4  ABDOMEN: soft, nontender, no hepatosplenomegaly, no masses and bowel sounds normal  MS: tenderness mild lower right chest wall to midline             Signed Electronically by: Malu Post PA-C    "

## 2024-04-16 ENCOUNTER — LAB (OUTPATIENT)
Dept: LAB | Facility: CLINIC | Age: 64
End: 2024-04-16
Payer: COMMERCIAL

## 2024-04-16 DIAGNOSIS — Z00.01 ENCOUNTER FOR ROUTINE ADULT MEDICAL EXAM WITH ABNORMAL FINDINGS: ICD-10-CM

## 2024-04-16 DIAGNOSIS — E78.5 HYPERLIPIDEMIA LDL GOAL <100: ICD-10-CM

## 2024-04-16 DIAGNOSIS — R73.9 BLOOD GLUCOSE ELEVATED: ICD-10-CM

## 2024-04-16 DIAGNOSIS — Z12.5 SCREENING FOR PROSTATE CANCER: ICD-10-CM

## 2024-04-16 LAB
ERYTHROCYTE [DISTWIDTH] IN BLOOD BY AUTOMATED COUNT: 11.7 % (ref 10–15)
HBA1C MFR BLD: 5.5 % (ref 0–5.6)
HCT VFR BLD AUTO: 48.3 % (ref 40–53)
HGB BLD-MCNC: 16.4 G/DL (ref 13.3–17.7)
MCH RBC QN AUTO: 30.8 PG (ref 26.5–33)
MCHC RBC AUTO-ENTMCNC: 34 G/DL (ref 31.5–36.5)
MCV RBC AUTO: 91 FL (ref 78–100)
PLATELET # BLD AUTO: 196 10E3/UL (ref 150–450)
RBC # BLD AUTO: 5.32 10E6/UL (ref 4.4–5.9)
WBC # BLD AUTO: 6.6 10E3/UL (ref 4–11)

## 2024-04-16 PROCEDURE — 85027 COMPLETE CBC AUTOMATED: CPT

## 2024-04-16 PROCEDURE — 80053 COMPREHEN METABOLIC PANEL: CPT

## 2024-04-16 PROCEDURE — G0103 PSA SCREENING: HCPCS

## 2024-04-16 PROCEDURE — 80061 LIPID PANEL: CPT

## 2024-04-16 PROCEDURE — 83036 HEMOGLOBIN GLYCOSYLATED A1C: CPT

## 2024-04-16 PROCEDURE — 36415 COLL VENOUS BLD VENIPUNCTURE: CPT

## 2024-04-17 LAB
ALBUMIN SERPL BCG-MCNC: 4.9 G/DL (ref 3.5–5.2)
ALP SERPL-CCNC: 73 U/L (ref 40–150)
ALT SERPL W P-5'-P-CCNC: 36 U/L (ref 0–70)
ANION GAP SERPL CALCULATED.3IONS-SCNC: 12 MMOL/L (ref 7–15)
AST SERPL W P-5'-P-CCNC: 29 U/L (ref 0–45)
BILIRUB SERPL-MCNC: 1 MG/DL
BUN SERPL-MCNC: 17.1 MG/DL (ref 8–23)
CALCIUM SERPL-MCNC: 10 MG/DL (ref 8.8–10.2)
CHLORIDE SERPL-SCNC: 104 MMOL/L (ref 98–107)
CHOLEST SERPL-MCNC: 172 MG/DL
CREAT SERPL-MCNC: 1.07 MG/DL (ref 0.67–1.17)
DEPRECATED HCO3 PLAS-SCNC: 26 MMOL/L (ref 22–29)
EGFRCR SERPLBLD CKD-EPI 2021: 78 ML/MIN/1.73M2
FASTING STATUS PATIENT QL REPORTED: YES
GLUCOSE SERPL-MCNC: 102 MG/DL (ref 70–99)
HDLC SERPL-MCNC: 47 MG/DL
LDLC SERPL CALC-MCNC: 100 MG/DL
NONHDLC SERPL-MCNC: 125 MG/DL
POTASSIUM SERPL-SCNC: 5 MMOL/L (ref 3.4–5.3)
PROT SERPL-MCNC: 7.8 G/DL (ref 6.4–8.3)
PSA SERPL DL<=0.01 NG/ML-MCNC: 2.38 NG/ML (ref 0–4.5)
SODIUM SERPL-SCNC: 142 MMOL/L (ref 135–145)
TRIGL SERPL-MCNC: 125 MG/DL

## 2024-04-26 SDOH — HEALTH STABILITY: PHYSICAL HEALTH: ON AVERAGE, HOW MANY DAYS PER WEEK DO YOU ENGAGE IN MODERATE TO STRENUOUS EXERCISE (LIKE A BRISK WALK)?: 5 DAYS

## 2024-04-26 SDOH — HEALTH STABILITY: PHYSICAL HEALTH: ON AVERAGE, HOW MANY MINUTES DO YOU ENGAGE IN EXERCISE AT THIS LEVEL?: 20 MIN

## 2024-04-26 ASSESSMENT — SOCIAL DETERMINANTS OF HEALTH (SDOH): HOW OFTEN DO YOU GET TOGETHER WITH FRIENDS OR RELATIVES?: MORE THAN THREE TIMES A WEEK

## 2024-04-29 ENCOUNTER — OFFICE VISIT (OUTPATIENT)
Dept: INTERNAL MEDICINE | Facility: CLINIC | Age: 64
End: 2024-04-29
Payer: COMMERCIAL

## 2024-04-29 VITALS
SYSTOLIC BLOOD PRESSURE: 132 MMHG | WEIGHT: 179.9 LBS | BODY MASS INDEX: 24.37 KG/M2 | HEART RATE: 79 BPM | RESPIRATION RATE: 10 BRPM | HEIGHT: 72 IN | DIASTOLIC BLOOD PRESSURE: 82 MMHG | OXYGEN SATURATION: 95 % | TEMPERATURE: 97.7 F

## 2024-04-29 DIAGNOSIS — R73.9 BLOOD GLUCOSE ELEVATED: ICD-10-CM

## 2024-04-29 DIAGNOSIS — Z11.59 NEED FOR HEPATITIS C SCREENING TEST: ICD-10-CM

## 2024-04-29 DIAGNOSIS — G62.9 NEUROPATHY: ICD-10-CM

## 2024-04-29 DIAGNOSIS — E78.5 HYPERLIPIDEMIA LDL GOAL <100: ICD-10-CM

## 2024-04-29 DIAGNOSIS — Z00.00 ROUTINE GENERAL MEDICAL EXAMINATION AT A HEALTH CARE FACILITY: Primary | ICD-10-CM

## 2024-04-29 DIAGNOSIS — Z12.5 SCREENING FOR PROSTATE CANCER: ICD-10-CM

## 2024-04-29 LAB
CRP SERPL-MCNC: <3 MG/L
HCV AB SERPL QL IA: NONREACTIVE
TSH SERPL DL<=0.005 MIU/L-ACNC: 1.27 UIU/ML (ref 0.3–4.2)
VIT B12 SERPL-MCNC: 668 PG/ML (ref 232–1245)

## 2024-04-29 PROCEDURE — 86038 ANTINUCLEAR ANTIBODIES: CPT | Performed by: INTERNAL MEDICINE

## 2024-04-29 PROCEDURE — 99396 PREV VISIT EST AGE 40-64: CPT | Performed by: INTERNAL MEDICINE

## 2024-04-29 PROCEDURE — 86039 ANTINUCLEAR ANTIBODIES (ANA): CPT | Performed by: INTERNAL MEDICINE

## 2024-04-29 PROCEDURE — 86803 HEPATITIS C AB TEST: CPT | Performed by: INTERNAL MEDICINE

## 2024-04-29 PROCEDURE — 36415 COLL VENOUS BLD VENIPUNCTURE: CPT | Performed by: INTERNAL MEDICINE

## 2024-04-29 PROCEDURE — 82607 VITAMIN B-12: CPT | Performed by: INTERNAL MEDICINE

## 2024-04-29 PROCEDURE — 86140 C-REACTIVE PROTEIN: CPT | Performed by: INTERNAL MEDICINE

## 2024-04-29 PROCEDURE — 99214 OFFICE O/P EST MOD 30 MIN: CPT | Mod: 25 | Performed by: INTERNAL MEDICINE

## 2024-04-29 PROCEDURE — 84443 ASSAY THYROID STIM HORMONE: CPT | Performed by: INTERNAL MEDICINE

## 2024-04-29 RX ORDER — ATORVASTATIN CALCIUM 40 MG/1
40 TABLET, FILM COATED ORAL DAILY
Qty: 90 TABLET | Refills: 3 | Status: SHIPPED | OUTPATIENT
Start: 2024-04-29

## 2024-04-29 ASSESSMENT — ANXIETY QUESTIONNAIRES
GAD7 TOTAL SCORE: 0
3. WORRYING TOO MUCH ABOUT DIFFERENT THINGS: NOT AT ALL
GAD7 TOTAL SCORE: 0
1. FEELING NERVOUS, ANXIOUS, OR ON EDGE: NOT AT ALL
6. BECOMING EASILY ANNOYED OR IRRITABLE: NOT AT ALL
5. BEING SO RESTLESS THAT IT IS HARD TO SIT STILL: NOT AT ALL
7. FEELING AFRAID AS IF SOMETHING AWFUL MIGHT HAPPEN: NOT AT ALL
2. NOT BEING ABLE TO STOP OR CONTROL WORRYING: NOT AT ALL
IF YOU CHECKED OFF ANY PROBLEMS ON THIS QUESTIONNAIRE, HOW DIFFICULT HAVE THESE PROBLEMS MADE IT FOR YOU TO DO YOUR WORK, TAKE CARE OF THINGS AT HOME, OR GET ALONG WITH OTHER PEOPLE: NOT DIFFICULT AT ALL

## 2024-04-29 ASSESSMENT — PATIENT HEALTH QUESTIONNAIRE - PHQ9: 5. POOR APPETITE OR OVEREATING: NOT AT ALL

## 2024-04-29 NOTE — PATIENT INSTRUCTIONS
"Continue current medications  Prescriptions refilled.    Labs today as ordered re: neuropathy  Call  [phone] or use [facility] to schedule a future lab appointment  fasting in 1 year.   Reduce carbohydrate (sugars, starchy foods such as bread, rice, potato, pasta, etc) intake  in your diet and increase the amount of color on your plate with fruits, vegetables and lean meats.   For fasting labs, please refrain from eating for 8 hours or more.   Drink 2 glasses of water before your lab appointment. It is fine to take your  oral medications on the morning of the lab test as usual  .Schedule a follow up appointment with me in clinic a few days after these future labs are drawn to review results and other medical issues as necessary  Because non-acute appointments to see me in clinic are currently booking out about 3 months at the clinic (for multiple reasons), please use [facility] [employer] or call the appointment line now to schedule the future appointment so that it is \"on the books\".   Referral to Neurology re: neuropathy. Central schedulers will contact you to schedule an appointment.    If you don't hear from a representative within 2 business days, please call [phone].   If neuropathy pain worsening between now and neurology appt and wish to consider medication, then let me know and will prescribed Gabapentin for symptoms  Check with insurance or speak with your pharmacist re: Shingrix vaccine coverage for shingles prevention if wish to receive.  This is a 2 shot series done 2-6 months apart  Consider covid and influenza/flu vaccinations in the Fall. May get through a pharmacy  Pt was informed regarding extra E&M billing for management of new or established medical issues not related to today's wellness/screening visit   "

## 2024-04-29 NOTE — PROGRESS NOTES
Preventive Care Visit  [facility]  Richard Purcell MD, Internal Medicine  Apr 29, 2024        ASSESSMENT:   1. Routine general medical examination at a health care facility  Patient declines vaccinations today.  Discussed option for  COVID, influenza, Shingrix.  Eye exam and colonoscopy screening up-to-date will consider- Comprehensive metabolic panel; Future    2. Hyperlipidemia LDL goal <100  Controlled.  Continue current medication.  Repeat lab 1 year  - atorvastatin (LIPITOR) 40 MG tablet; Take 1 tablet (40 mg) by mouth daily  Dispense: 90 tablet; Refill: 3  - Lipid panel reflex to direct LDL Fasting; Future  - Comprehensive metabolic panel; Future    3. Blood glucose elevated  Mild glucose elevation with normal A1c.  No need for medication.  Counseled regarding carbohydrate reduction.  Repeat lab 1 year  - Hemoglobin A1c; Future  - Comprehensive metabolic panel; Future    4. Neuropathy  Chronic issue.  Previous lower extremity EMG normal 2021.  Previous lumbar MRI reviewed.  Symptoms present in the feet only.  Has not improved with use of orthotics.  Labs as ordered for metabolic etiology.  Referral to neurology.  Discussed option of gabapentin for symptom treatment of worsening  - Vitamin B12; Future  - TSH with free T4 reflex; Future  - Anti Nuclear Liya IgG by IFA with Reflex; Future  - CRP inflammation; Future  - Adult Neurology  Referral; Future    5. Need for hepatitis C screening test  Candidate for screening based on age  - Hepatitis C antibody; Future    6. Screening for prostate cancer  Recent PSA normal.  Repeat screening 1 year  - Prostate Specific Antigen Screen; Future          PLAN:  Continue current medications  Prescriptions refilled.    Labs today as ordered re: neuropathy  Call  [phone] or use [facility] to schedule a future lab appointment  fasting in 1 year.   Reduce carbohydrate (sugars, starchy foods such as bread, rice, potato, pasta, etc) intake  in your  "diet and increase the amount of color on your plate with fruits, vegetables and lean meats.   For fasting labs, please refrain from eating for 8 hours or more.   Drink 2 glasses of water before your lab appointment. It is fine to take your  oral medications on the morning of the lab test as usual  .Schedule a follow up appointment with me in clinic a few days after these future labs are drawn to review results and other medical issues as necessary  Because non-acute appointments to see me in clinic are currently booking out about 3 months at the clinic (for multiple reasons), please use [facility] or call the appointment line now to schedule the future appointment so that it is \"on the books\".   Referral to Neurology re: neuropathy. Central schedulers will contact you to schedule an appointment.    If you don't hear from a representative within 2 business days, please call [phone].   If neuropathy pain worsening between now and neurology appt and wish to consider medication, then let me know and will prescribed Gabapentin for symptoms  Check with insurance or speak with your pharmacist re: Shingrix vaccine coverage for shingles prevention if wish to receive.  This is a 2 shot series done 2-6 months apart  Consider covid and influenza/flu vaccinations in the Fall. May get through a pharmacy  Pt was informed regarding extra E&M billing for management of new or established medical issues not related to today's wellness/screening visit            Tami Boland is a 63 year old, presenting for the following:  Wellness Visit  And follow-up hyperlipidemia, anxiety          4/29/2024    12:54 PM   Additional Questions   Roomed by Brayden   Accompanied by Self        Health Care Directive  Patient does not have a Health Care Directive or Living Will: pt will consider    HPI         4/26/2024   General Health   How would you rate your overall physical health? Good   Feel stress (tense, anxious, or unable to sleep) " Not at all         4/26/2024   Nutrition   Three or more servings of calcium each day? Yes   Diet: Regular (no restrictions)   How many servings of fruit and vegetables per day? 4 or more   How many sweetened beverages each day? 0-1         4/26/2024   Exercise   Days per week of moderate/strenous exercise 5 days   Average minutes spent exercising at this level 20 min         4/26/2024   Social Factors   Frequency of gathering with friends or relatives More than three times a week   Worry food won't last until get money to buy more No   Food not last or not have enough money for food? No   Do you have housing?  Yes   Are you worried about losing your housing? No   Lack of transportation? No   Unable to get utilities (heat,electricity)? No         4/26/2024   Fall Risk   Fallen 2 or more times in the past year? No   Trouble with walking or balance? No          4/26/2024   Dental   Dentist two times every year? Yes         4/26/2024   TB Screening   Were you born outside of the [place]? No         Today's PHQ-2 Score:       4/29/2024    12:38 PM   PHQ-2 ( 1999 [employer])   Q1: Little interest or pleasure in doing things 0   Q2: Feeling down, depressed or hopeless 0   PHQ-2 Score 0   Q1: Little interest or pleasure in doing things Not at all   Q2: Feeling down, depressed or hopeless Not at all   PHQ-2 Score 0           4/26/2024   Substance Use   Alcohol more than 3/day or more than 7/wk No   Do you use any other substances recreationally? (!) ALCOHOL    (!) CANNABIS PRODUCTS     Social History     Tobacco Use    Smoking status: Never    Smokeless tobacco: Never   Vaping Use    Vaping status: Never Used   Substance Use Topics    Alcohol use: Yes     Comment: 2 or 3 drinks a week    Drug use: Never          4/26/2024   STI Screening   New sexual partner(s) since last STI/HIV test? No   Last PSA:   PSA   Date Value Ref Range Status   08/27/2019 1.96 0 - 4 ug/L Final     Comment:     Assay Method:  Chemiluminescence using Siemens  [place] analyzer     Prostate Specific Antigen Screen   Date Value Ref Range Status   04/16/2024 2.38 0.00 - 4.50 ng/mL Final     ASCVD Risk   The 10-year ASCVD risk score (Ping SANDOVAL, et al., 2019) is: 9.8%    Values used to calculate the score:      Age: 63 years      Sex: Male      Is Non- : No      Diabetic: No      Tobacco smoker: No      Systolic Blood Pressure: 132 mmHg      Is BP treated: No      HDL Cholesterol: 47 mg/dL      Total Cholesterol: 172 mg/dL     Sexual health reviewed and updated as needed this visit by Provider            Review of Systems  CONSTITUTIONAL: NEGATIVE for fever, chills. Weight up 1 pound  INTEGUMENTARY/SKIN: NEGATIVE for worrisome rashes, moles or lesions  EYES: NEGATIVE for  irritation. Has glasses/contact  and eye exam last month    ENT/MOUTH: NEGATIVE for ear, mouth and throat problems  RESP: NEGATIVE for significant cough or SOB  CV: NEGATIVE for chest pain, palpitations or peripheral edema  GI: NEGATIVE for nausea, abdominal pain, heartburn, or change in bowel habits  : NEGATIVE for frequency, dysuria, or hematuria  MUSCULOSKELETAL: NEGATIVE for significant arthralgias or myalgia  NEURO: NEGATIVE for weakness, dizziness. Warm feeling and   tingle in feet in ball of feet (R>L) mostly but some in all of feet. Neg EMG 2021. MR LS spine 2021 reviewed.   Sx better in AM.  Minimal worsening  past few years. No improvement with arch support.  Gets sense if feet getter hotter in the night  ENDOCRINE: NEGATIVE for temperature intolerance   HEME: NEGATIVE for bleeding problems  PSYCHIATRIC: NEGATIVE for changes in mood or affect overall. GAD7 = 0. Has used 5-6 tabs total  or propanolol in past 1 year for anxiety     Objective    Exam  /82   Pulse 79   Temp 97.7  F (36.5  C) (Temporal)   Resp 10   Ht 1.829 m (6')   Wt 81.6 kg (179 lb 14.4 oz)   SpO2 95%   BMI 24.40 kg/m     Estimated body mass index is 24.4 kg/m  as calculated from the  following:    Height as of this encounter: 1.829 m (6').    Weight as of this encounter: 81.6 kg (179 lb 14.4 oz).    Physical Exam  General appearance - healthy, alert, no distress  Skin - No rashes or lesions.  Head - normocephalic, atraumatic  Eyes - PRIYANKA, EOMI, fundi exam with nondilated pupils negative.  Ears - External ears normal. Canals clear. TM's normal.  Nose/Sinuses - Nares normal. Septum midline. Mucosa normal. No drainage or sinus tenderness.  Oropharynx - No erythema, no adenopathy, no exudates.  Neck - Supple without adenopathy or thyromegaly. No bruits.  Lungs - Clear to auscultation without wheezes/rhonchi.  Heart - Regular rate and rhythm without murmurs, clicks, or gallops.  Nodes - No supraclavicular, axillary, or inguinal adenopathy palpable.  Abdomen - Abdomen soft, non-tender. BS normal. No masses or hepatosplenomegaly palpable. No bruits.  Extremities -No cyanosis, clubbing or edema.    Musculoskeletal - Spine ROM normal. Muscular strength intact.   Peripheral pulses - radial=4/4, femoral=4/4, posterior tibial=4/4, dorsalis pedis=4/4,  Neuro - Gait normal. Reflexes normal and symmetric. Sensation grossly WNL to LTS despite subjective sx.  Genital - Normal-appearing male external genitalia. No scrotal masses or inguinal hernia palpable.   Rectal - deferred          Signed Electronically by: Richard Purcell MD

## 2024-04-30 LAB
ANA PAT SER IF-IMP: ABNORMAL
ANA SER QL IF: POSITIVE
ANA TITR SER IF: ABNORMAL {TITER}

## 2024-05-16 ENCOUNTER — MYC MEDICAL ADVICE (OUTPATIENT)
Dept: INTERNAL MEDICINE | Facility: CLINIC | Age: 64
End: 2024-05-16
Payer: COMMERCIAL

## 2024-05-16 DIAGNOSIS — R76.8 POSITIVE ANA (ANTINUCLEAR ANTIBODY): Primary | ICD-10-CM

## 2024-06-04 ENCOUNTER — LAB (OUTPATIENT)
Dept: LAB | Facility: CLINIC | Age: 64
End: 2024-06-04
Payer: COMMERCIAL

## 2024-06-04 DIAGNOSIS — R76.8 POSITIVE ANA (ANTINUCLEAR ANTIBODY): ICD-10-CM

## 2024-06-04 PROCEDURE — 86235 NUCLEAR ANTIGEN ANTIBODY: CPT

## 2024-06-04 PROCEDURE — 36415 COLL VENOUS BLD VENIPUNCTURE: CPT

## 2024-06-04 PROCEDURE — 86225 DNA ANTIBODY NATIVE: CPT

## 2024-06-04 PROCEDURE — 83516 IMMUNOASSAY NONANTIBODY: CPT | Mod: 90

## 2024-06-04 PROCEDURE — 99000 SPECIMEN HANDLING OFFICE-LAB: CPT

## 2024-06-06 LAB — HISTONE IGG SER IA-ACNC: 4.2 UNITS

## 2024-06-07 LAB
DSDNA AB SER-ACNC: 4.8 IU/ML
ENA JO1 AB SER IA-ACNC: <0.5 U/ML
ENA JO1 IGG SER-ACNC: NEGATIVE
ENA SM IGG SER IA-ACNC: <0.7 U/ML
ENA SM IGG SER IA-ACNC: NEGATIVE
ENA SS-A AB SER IA-ACNC: <0.5 U/ML
ENA SS-A AB SER IA-ACNC: NEGATIVE
ENA SS-B IGG SER IA-ACNC: <0.6 U/ML
ENA SS-B IGG SER IA-ACNC: NEGATIVE
U1 SNRNP IGG SER IA-ACNC: <1.1 U/ML
U1 SNRNP IGG SER IA-ACNC: NEGATIVE

## 2024-06-10 ENCOUNTER — OFFICE VISIT (OUTPATIENT)
Dept: NEUROLOGY | Facility: CLINIC | Age: 64
End: 2024-06-10
Attending: INTERNAL MEDICINE
Payer: COMMERCIAL

## 2024-06-10 VITALS — OXYGEN SATURATION: 96 % | SYSTOLIC BLOOD PRESSURE: 137 MMHG | HEART RATE: 65 BPM | DIASTOLIC BLOOD PRESSURE: 90 MMHG

## 2024-06-10 DIAGNOSIS — I73.81 ERYTHROMELALGIA (H): Primary | ICD-10-CM

## 2024-06-10 DIAGNOSIS — M47.816 SPONDYLOSIS OF LUMBAR REGION WITHOUT MYELOPATHY OR RADICULOPATHY: ICD-10-CM

## 2024-06-10 DIAGNOSIS — R20.8 DYSESTHESIA: ICD-10-CM

## 2024-06-10 PROBLEM — G44.89 OTHER HEADACHE SYNDROME: Status: ACTIVE | Noted: 2018-08-07

## 2024-06-10 PROCEDURE — 99205 OFFICE O/P NEW HI 60 MIN: CPT | Performed by: STUDENT IN AN ORGANIZED HEALTH CARE EDUCATION/TRAINING PROGRAM

## 2024-06-10 NOTE — LETTER
6/10/2024      Krystian Akins  [address]  [place] [address]      Dear Colleague,    Thank you for referring your patient, Krystian Akins, to the [facility]. Please see a copy of my visit note below.    CHIEF COMPLAINT / REASON FOR VISIT  neuropathy    Referred by Dr. Purcell (PCP)    HISTORY OF PRESENT ILLNESS   is a 64 year old male presenting [facility] evaluation of peripheral neuropathy.     He describes a sensation of feeling hot, warm, and burning pain in the toes and from half of feet.  Symptoms started 3 to 5 years ago but became more noticeable recently.  Symptoms typically the most severe towards the end of the day or after sitting for too long or lying on his back.  Symptoms are also triggered by heat.  He does not like to wear socks.  He sleeps with his feet hanging out of the blanket at nighttime.  He typically has fan on his feet.  Ice pack and cooling help relieve the symptoms.  He sometimes noticed red discoloration/flushing of the toes. he reports numbness in the toes and balls of his feet.  He has chronic back pain, which he has been going to chiropractor.  He denies any radiating pain down the leg.  No symptoms in the hands.    He has tried shoe insertions and orthotics without any improvement.    Mr. Akins denies weight loss.  No orthostatic lightheadedness.  No change in ability to sweat.  No erectile dysfunction.  No early satiety.  He is very athletic and used to do triathlon.  He currently enjoys golfing, bicycling, and functional strength exercise.    I have reviewed prior investigations as listed below.  -EMG in 2021 did not show any electrophysiologic evidence of a large fiber peripheral neuropathy or left lumbosacral radiculopathy.  -Vitamin B12 668  -TSH 1.27  -CHRISTIE mildly positive at 1:160, CRP normal, SSA, SSB, double-stranded DNA negative  -Hemoglobin A1c 5.5%  -MRI lumbar spine 9/11/2021:   1.  At the  L2/L3 level, there is asymmetric disc bulge, posterior  annular fissure, with a superimposed focal disc protrusion within the  right lateral recess contributing to right lateral recess narrowing  with abutment of the descending right L3 nerve root without definite  evidence of impingement. Correlation with right L3 radiculopathy is  suggested.  2.  No significant posterior disc bulge or spinal canal narrowing at  any other level within the lumbar spine.  3.  At the L4/L5 level, posterolateral disc disease and facet  arthropathy contribute to moderate bilateral neural frontal narrowing.    REVIEW OF SYSTEMS  All negative except for what indicated in the HPI. The following portions of the patient's history were reviewed and updated as appropriate: allergies, current medications, family history, medical history, surgical history, social history, and problem list.     PAST MEDICAL/SURGICAL HISTORY   Past Medical History:   Diagnosis Date     Arrhythmia     no s/s     Gastric ulcer 2015     Generalized anxiety disorder      GI bleed      HLD (hyperlipidemia)      Hyperlipidemia LDL goal <100      Migraine      Nephrolithiasis      Past Surgical History:   Procedure Laterality Date     AS ESOPHAGOSCOPY, DIAGNOSTIC       COMBINED CYSTOSCOPY, RETROGRADES, URETEROSCOPY, INSERT STENT Left 5/16/2019    Procedure: 1. Cystourethroscopy 2. Left ureteroscopy with laser stand-by 3. Left retrograde pyelography with interpretation of intraoperative fluoroscopic imaging 4. Left ureteral stent placement  ;  Surgeon: Enrique Parker MD;  Location: [facility] OR     COMBINED CYSTOSCOPY, RETROGRADES, URETEROSCOPY, LASER HOLMIUM LITHOTRIPSY URETER(S), INSERT STENT Left 5/30/2019    Procedure: 1. Cystourethroscopy 2. Left ureteroscopy with holmium laser lithotripsy and stone extraction 3. Left retrograde pyelography with interpretation of intraoperative fluoroscopic imaging 4. Left ureteral stent exchange;  Surgeon: Enrique Parker  Truman Pineda MD;  Location: [facility] OR     CYSTOSCOPY       VASECTOMY          MEDICATIONS    Current Outpatient Medications:      atorvastatin (LIPITOR) 40 MG tablet, Take 1 tablet (40 mg) by mouth daily, Disp: 90 tablet, Rfl: 3     propranolol (INDERAL) 10 MG tablet, Take 1 tablet (10 mg) by mouth 3 times daily as needed (anxiety), Disp: 60 tablet, Rfl: 2    ALLERGIES:  Allergies   Allergen Reactions     Hydrocodone-Acetaminophen Nausea     LW Reaction: nausea       PHYSICAL EXAM  BP (!) 137/90   Pulse 65   SpO2 96%     NEUROLOGICAL EXAMINATION  Mental status: normal.  Speech: normal.  Muscle strength: Normal muscle strength 5/5 proximally and distally in upper and lower limbs.  Sensation: Intact sensation to light touch, pinprick, cold temperature, vibration, and joint proprioception in upper and lower limbs.  Deep tendon reflexes: Normal reflexes in the upper and lower limbs including intact bilateral ankle reflexes  High arched feet: Absent  Hammertoes: Absent  Coordination: normal rapid alternating movements and finger to nose testing  Gait: normal.  Walk on heels: yes, bilaterally.  Walk on toes: yes, bilaterally.    Getting up from seated position without pushing on chair: yes.  Posture: normal.  Romberg: negative.    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 Mr. Akins's neurological exam today is normal.  There is no objective evidence to suggest large or small fiber neuropathy.  He does not have signs or symptoms of autonomic neuropathy.  We discussed obtaining EMG to further evaluate for peripheral neuropathy as well as to look for evidence of lumbosacral radiculopathy given chronic lower back pain and worsening symptoms with lying on his back.    After discussion with Mr. Akins, we have agreed to proceed with the following  investigations and management:    Recommendations:  -EMG/nerve conduction study.  -Symptomatic management of erythromelalgia was discussed with patient as listed below.  We agree on trying conservative management with lifestyle changes, cooling behaviors, and topical treatment.  If symptoms continue to be bothersome, he can reach out to me and we can start gabapentin at nighttime.  -Follow-up virtually after EMG.    PATIENT EDUCATION  Ready to learn, no apparent learning barriers were identified; learning preferences include listening.  Explained diagnosis and treatment plan; patient expressed understanding of the content.    Erythromelalgia  1.  Patient education - publications concerning this entity were shared with the patient  2.  Prognosis - over time, the symptoms may worsen, stay the same, or get better.  It happens about equal portions of people over time. In approximately 10%, patients may report resolution of symptoms  3.  Patient support groups- The Erythromelalgia Association, chat groups on [employer], [employer], other social media  4.  Management -  The aim of management is to get back to as normal a style of life as possible. Our aim is to get erythromelalgia symptoms under control and also to learn to live with whatever is left. In our experience most patients can get acceptable control of their symptoms with lifestyle modifications and topical treatments; only a minority of patients need systemic medications.        Lifestyle changes -- avoid situations as much as practically possible that set off symptoms, while keeping a balance and trying to maintain a normal lifestyle.  This is a difficult balance, but ideally, patients should be able to return to a normal style of life and not be inhibited by their symptoms.     Reassuringly, there is no evidence that episodes of erythromelalgia cause damage to the nerves or skin.     Cooling behaviors: Moderation is key.    *     It is okay to use techniques that cool  the affected areas for short periods of time (eg 5 minutes every 1-2 hours), but these should not be used for prolonged periods as they can damage the skin, vasculature and nerves in the affected area and in the long run make the pain worse and lead to complications.    *     Also moderation in the technique used is advisable- avoid extreme behaviors.  Cool water indirectly applied to skin is advisable - but not ice or very cold water directly applied to the skin.  Examples that patients report that help include Chillow pillows but again, these should be used for short periods.  http://www.[employer]/chillow.html     Suggested activities: Many patients report that swimming is helpful to them.    Topical treatments that may help are as follows:   Each should be tried one at a time for a period of  2-4 weeks, if medication does not work then move on to the next medication.       It would be very helpful if you could let us know which of  below you have found most helpful via the patient portal after approximately 3 months      Most of these prescriptions are compounded products: it is best to have compounded creams filled at compound pharmacy as they are familiar with how to compound these specific ones     For the pain:    A trial of amitriptyline 2% and ketamine 0.5% applied up to three times daily.  Many patients report that this helps tremendously with their discomfort, but other patients say it does not work at all.  If it does not work, a trial of giving a mixture with a higher concentration of ketamine such as 5% ketamine instead of 0.5% ketamine could be considered as a trial.    Lidocaine patches. These are easiest to use on the top of the feet.  You can try the over-the-counter 4% lidocaine patches (various brands) (no prescription needed) or the prescription 'Lidoderm' patches (5% lidocaine) for 12-24 hours, applied to the affected area (eg top of the feet).     Other medications that could be tried in the  future:    Lidocaine 4% cream (over the counter)     Trial of pain-relieving rub or patch (Ultra Strength BenGay Cream, Ultra Strength BenGay Pain Relieving Patch, others),     Voltaren 1% gel applied four times a day     Gabapentin ointment 6% in Aquaphor or Vanicream     For the redness:  Available over the counter:    Afrin spray (oxymetazoline) 2-3 times daily to affected areas    Preparation H (phenylephrine) applied 1-3 times daily to affected areas  Available by prescription:    Topical brimonidine tartrate gel 0.05% (Mirvaso) (FDA approved for the redness of rosacea) could be tried- apply 1-3 times daily    Topical Timolol    Topical midodrine 0.2% in Vanicream applied TID (compounded only at [facility])    Oxymetazoline (prescription)     Systemic treatments:   All systemic treatments are associated with potential side-effects, and different people respond to different drugs.  These are summarized in the manuscript   http://www.ncbi.nlm.nih.gov/pubmed/96166930      Drugs that have been reported to help include:     Aspirin-  Trial of aspirin (if not contraindicated) 325 mg daily x 1 month  Because a subset of patients respond to aspirin (particularly erythromelalgia associated with myeloproliferative disease), it is always worthwhile trying oral aspirin first for up to a month to see if that helps symptoms    Gabapentin    Pregabalin    Venlaxafine    Other drugs used for small fiber neuropathy    Mexiletine    Misoprostol    I spent a total of 60 minutes on the day of the visit for chart review, face-to-face visit, counseling/coordination of care, and documentation. Please see the note for further information on patient assessment and treatment.         Again, thank you for allowing me to participate in the care of your patient.        Sincerely,        Facundo Prince MD

## 2024-06-10 NOTE — PATIENT INSTRUCTIONS
Erythromelalgia  1.  Patient education - publications concerning this entity were shared with the patient  2.  Prognosis - over time, the symptoms may worsen, stay the same, or get better.  It happens about equal portions of people over time. In approximately 10%, patients may report resolution of symptoms  3.  Patient support groups- The Erythromelalgia Association, chat groups on [employer], [employer], other social media  4.  Management -  The aim of management is to get back to as normal a style of life as possible. Our aim is to get erythromelalgia symptoms under control and also to learn to live with whatever is left. In our experience most patients can get acceptable control of their symptoms with lifestyle modifications and topical treatments; only a minority of patients need systemic medications.        Lifestyle changes -- avoid situations as much as practically possible that set off symptoms, while keeping a balance and trying to maintain a normal lifestyle.  This is a difficult balance, but ideally, patients should be able to return to a normal style of life and not be inhibited by their symptoms.     Reassuringly, there is no evidence that episodes of erythromelalgia cause damage to the nerves or skin.     Cooling behaviors: Moderation is key.    *     It is okay to use techniques that cool the affected areas for short periods of time (eg 5 minutes every 1-2 hours), but these should not be used for prolonged periods as they can damage the skin, vasculature and nerves in the affected area and in the long run make the pain worse and lead to complications.    *     Also moderation in the technique used is advisable- avoid extreme behaviors.  Cool water indirectly applied to skin is advisable - but not ice or very cold water directly applied to the skin.  Examples that patients report that help include Chillow pillows but again, these should be used for short periods.  http://www.[employer]/chillow.html      Suggested activities: Many patients report that swimming is helpful to them.     Topical treatments that may help are as follows:      Each should be tried one at a time for a period of  2-4 weeks, if medication does not work then move on to the next medication.       It would be very helpful if you could let us know which of  below you have found most helpful via the patient portal after approximately 3 months      Most of these prescriptions are compounded products: it is best to have compounded creams filled at compound pharmacy as they are familiar with how to compound these specific ones     For the pain:   A trial of amitriptyline 2% and ketamine 0.5% applied up to three times daily.  Many patients report that this helps tremendously with their discomfort, but other patients say it does not work at all.  If it does not work, a trial of giving a mixture with a higher concentration of ketamine such as 5% ketamine instead of 0.5% ketamine could be considered as a trial.   Lidocaine patches. These are easiest to use on the top of the feet.  You can try the over-the-counter 4% lidocaine patches (various brands) (no prescription needed) or the prescription 'Lidoderm' patches (5% lidocaine) for 12-24 hours, applied to the affected area (eg top of the feet).     Other medications that could be tried in the future:   Lidocaine 4% cream (over the counter)    Trial of pain-relieving rub or patch (Ultra Strength BenGay Cream, Ultra Strength BenGay Pain Relieving Patch, others),    Voltaren 1% gel applied four times a day    Gabapentin ointment 6% in Aquaphor or Vanicream     For the redness:  Available over the counter:   Afrin spray (oxymetazoline) 2-3 times daily to affected areas   Preparation H (phenylephrine) applied 1-3 times daily to affected areas  Available by prescription:   Topical brimonidine tartrate gel 0.05% (Mirvaso) (FDA approved for the redness of rosacea) could be tried- apply 1-3 times daily    Topical Timolol   Topical midodrine 0.2% in Vanicream applied TID (compounded only at [facility])   Oxymetazoline (prescription)     Systemic treatments:   All systemic treatments are associated with potential side-effects, and different people respond to different drugs.  These are summarized in the manuscript   http://www.ncbi.nlm.nih.gov/pubmed/39455286      Drugs that have been reported to help include:    Aspirin-  Trial of aspirin (if not contraindicated) 325 mg daily x 1 month  Because a subset of patients respond to aspirin (particularly erythromelalgia associated with myeloproliferative disease), it is always worthwhile trying oral aspirin first for up to a month to see if that helps symptoms   Gabapentin   Pregabalin   Venlaxafine   Other drugs used for small fiber neuropathy   Mexiletine   Misoprostol

## 2024-06-10 NOTE — PROGRESS NOTES
CHIEF COMPLAINT / REASON FOR VISIT  neuropathy    Referred by Dr. Purcell (PCP)    HISTORY OF PRESENT ILLNESS   is a 64 year old male presenting [facility] evaluation of peripheral neuropathy.     He describes a sensation of feeling hot, warm, and burning pain in the toes and from half of feet.  Symptoms started 3 to 5 years ago but became more noticeable recently.  Symptoms typically the most severe towards the end of the day or after sitting for too long or lying on his back.  Symptoms are also triggered by heat.  He does not like to wear socks.  He sleeps with his feet hanging out of the blanket at nighttime.  He typically has fan on his feet.  Ice pack and cooling help relieve the symptoms.  He sometimes noticed red discoloration/flushing of the toes. he reports numbness in the toes and balls of his feet.  He has chronic back pain, which he has been going to chiropractor.  He denies any radiating pain down the leg.  No symptoms in the hands.    He has tried shoe insertions and orthotics without any improvement.    Mr. Akins denies weight loss.  No orthostatic lightheadedness.  No change in ability to sweat.  No erectile dysfunction.  No early satiety.  He is very athletic and used to do triathlon.  He currently enjoys golfing, bicycling, and functional strength exercise.    I have reviewed prior investigations as listed below.  -EMG in 2021 did not show any electrophysiologic evidence of a large fiber peripheral neuropathy or left lumbosacral radiculopathy.  -Vitamin B12 668  -TSH 1.27  -CHRISTIE mildly positive at 1:160, CRP normal, SSA, SSB, double-stranded DNA negative  -Hemoglobin A1c 5.5%  -MRI lumbar spine 9/11/2021:   1.  At the L2/L3 level, there is asymmetric disc bulge, posterior  annular fissure, with a superimposed focal disc protrusion within the  right lateral recess contributing to right lateral recess narrowing  with abutment of the descending right L3 nerve root without  definite  evidence of impingement. Correlation with right L3 radiculopathy is  suggested.  2.  No significant posterior disc bulge or spinal canal narrowing at  any other level within the lumbar spine.  3.  At the L4/L5 level, posterolateral disc disease and facet  arthropathy contribute to moderate bilateral neural frontal narrowing.    REVIEW OF SYSTEMS  All negative except for what indicated in the HPI. The following portions of the patient's history were reviewed and updated as appropriate: allergies, current medications, family history, medical history, surgical history, social history, and problem list.     PAST MEDICAL/SURGICAL HISTORY   Past Medical History:   Diagnosis Date    Arrhythmia     no s/s    Gastric ulcer 2015    Generalized anxiety disorder     GI bleed     HLD (hyperlipidemia)     Hyperlipidemia LDL goal <100     Migraine     Nephrolithiasis      Past Surgical History:   Procedure Laterality Date    AS ESOPHAGOSCOPY, DIAGNOSTIC      COMBINED CYSTOSCOPY, RETROGRADES, URETEROSCOPY, INSERT STENT Left 5/16/2019    Procedure: 1. Cystourethroscopy 2. Left ureteroscopy with laser stand-by 3. Left retrograde pyelography with interpretation of intraoperative fluoroscopic imaging 4. Left ureteral stent placement  ;  Surgeon: Enrique Parker MD;  Location: [facility]    COMBINED CYSTOSCOPY, RETROGRADES, URETEROSCOPY, LASER HOLMIUM LITHOTRIPSY URETER(S), INSERT STENT Left 5/30/2019    Procedure: 1. Cystourethroscopy 2. Left ureteroscopy with holmium laser lithotripsy and stone extraction 3. Left retrograde pyelography with interpretation of intraoperative fluoroscopic imaging 4. Left ureteral stent exchange;  Surgeon: Enrique Parker MD;  Location: [facility] OR    CYSTOSCOPY      VASECTOMY          MEDICATIONS    Current Outpatient Medications:     atorvastatin (LIPITOR) 40 MG tablet, Take 1 tablet (40 mg) by mouth daily, Disp: 90 tablet, Rfl: 3    propranolol (INDERAL) 10 MG tablet, Take 1  tablet (10 mg) by mouth 3 times daily as needed (anxiety), Disp: 60 tablet, Rfl: 2    ALLERGIES:  Allergies   Allergen Reactions    Hydrocodone-Acetaminophen Nausea     LW Reaction: nausea       PHYSICAL EXAM  BP (!) 137/90   Pulse 65   SpO2 96%     NEUROLOGICAL EXAMINATION  Mental status: normal.  Speech: normal.  Muscle strength: Normal muscle strength 5/5 proximally and distally in upper and lower limbs.  Sensation: Intact sensation to light touch, pinprick, cold temperature, vibration, and joint proprioception in upper and lower limbs.  Deep tendon reflexes: Normal reflexes in the upper and lower limbs including intact bilateral ankle reflexes  High arched feet: Absent  Hammertoes: Absent  Coordination: normal rapid alternating movements and finger to nose testing  Gait: normal.  Walk on heels: yes, bilaterally.  Walk on toes: yes, bilaterally.    Getting up from seated position without pushing on chair: yes.  Posture: normal.  Romberg: negative.    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 Mr. Akins's neurological exam today is normal.  There is no objective evidence to suggest large or small fiber neuropathy.  He does not have signs or symptoms of autonomic neuropathy.  We discussed obtaining EMG to further evaluate for peripheral neuropathy as well as to look for evidence of lumbosacral radiculopathy given chronic lower back pain and worsening symptoms with lying on his back.    After discussion with Mr. Akins, we have agreed to proceed with the following investigations and management:    Recommendations:  -EMG/nerve conduction study.  -Symptomatic management of erythromelalgia was discussed with patient as listed below.  We agree on trying conservative management with lifestyle changes, cooling behaviors, and topical  treatment.  If symptoms continue to be bothersome, he can reach out to me and we can start gabapentin at nighttime.  -Follow-up virtually after EMG.    PATIENT EDUCATION  Ready to learn, no apparent learning barriers were identified; learning preferences include listening.  Explained diagnosis and treatment plan; patient expressed understanding of the content.    Erythromelalgia  1.  Patient education - publications concerning this entity were shared with the patient  2.  Prognosis - over time, the symptoms may worsen, stay the same, or get better.  It happens about equal portions of people over time. In approximately 10%, patients may report resolution of symptoms  3.  Patient support groups- The Erythromelalgia Association, chat groups on [employer], [employer], other social media  4.  Management -  The aim of management is to get back to as normal a style of life as possible. Our aim is to get erythromelalgia symptoms under control and also to learn to live with whatever is left. In our experience most patients can get acceptable control of their symptoms with lifestyle modifications and topical treatments; only a minority of patients need systemic medications.        Lifestyle changes -- avoid situations as much as practically possible that set off symptoms, while keeping a balance and trying to maintain a normal lifestyle.  This is a difficult balance, but ideally, patients should be able to return to a normal style of life and not be inhibited by their symptoms.     Reassuringly, there is no evidence that episodes of erythromelalgia cause damage to the nerves or skin.     Cooling behaviors: Moderation is key.    *     It is okay to use techniques that cool the affected areas for short periods of time (eg 5 minutes every 1-2 hours), but these should not be used for prolonged periods as they can damage the skin, vasculature and nerves in the affected area and in the long run make the pain worse and lead to complications.     *     Also moderation in the technique used is advisable- avoid extreme behaviors.  Cool water indirectly applied to skin is advisable - but not ice or very cold water directly applied to the skin.  Examples that patients report that help include Chillow pillows but again, these should be used for short periods.  http://www.chillow.[employer]/chillow.html     Suggested activities: Many patients report that swimming is helpful to them.    Topical treatments that may help are as follows:   Each should be tried one at a time for a period of  2-4 weeks, if medication does not work then move on to the next medication.       It would be very helpful if you could let us know which of  below you have found most helpful via the patient portal after approximately 3 months      Most of these prescriptions are compounded products: it is best to have compounded creams filled at compound pharmacy as they are familiar with how to compound these specific ones     For the pain:   A trial of amitriptyline 2% and ketamine 0.5% applied up to three times daily.  Many patients report that this helps tremendously with their discomfort, but other patients say it does not work at all.  If it does not work, a trial of giving a mixture with a higher concentration of ketamine such as 5% ketamine instead of 0.5% ketamine could be considered as a trial.   Lidocaine patches. These are easiest to use on the top of the feet.  You can try the over-the-counter 4% lidocaine patches (various brands) (no prescription needed) or the prescription 'Lidoderm' patches (5% lidocaine) for 12-24 hours, applied to the affected area (eg top of the feet).     Other medications that could be tried in the future:   Lidocaine 4% cream (over the counter)    Trial of pain-relieving rub or patch (Ultra Strength BenGay Cream, Ultra Strength BenGay Pain Relieving Patch, others),    Voltaren 1% gel applied four times a day    Gabapentin ointment 6% in Aquaphor or Vanicream      For the redness:  Available over the counter:   Afrin spray (oxymetazoline) 2-3 times daily to affected areas   Preparation H (phenylephrine) applied 1-3 times daily to affected areas  Available by prescription:   Topical brimonidine tartrate gel 0.05% (Mirvaso) (FDA approved for the redness of rosacea) could be tried- apply 1-3 times daily   Topical Timolol   Topical midodrine 0.2% in Vanicream applied TID (compounded only at [facility])   Oxymetazoline (prescription)     Systemic treatments:   All systemic treatments are associated with potential side-effects, and different people respond to different drugs.  These are summarized in the manuscript   http://www.ncbi.nlm.nih.gov/pubmed/87444884      Drugs that have been reported to help include:    Aspirin-  Trial of aspirin (if not contraindicated) 325 mg daily x 1 month  Because a subset of patients respond to aspirin (particularly erythromelalgia associated with myeloproliferative disease), it is always worthwhile trying oral aspirin first for up to a month to see if that helps symptoms   Gabapentin   Pregabalin   Venlaxafine   Other drugs used for small fiber neuropathy   Mexiletine   Misoprostol    I spent a total of 60 minutes on the day of the visit for chart review, face-to-face visit, counseling/coordination of care, and documentation. Please see the note for further information on patient assessment and treatment.

## 2024-08-30 ENCOUNTER — OFFICE VISIT (OUTPATIENT)
Dept: DERMATOLOGY | Facility: CLINIC | Age: 64
End: 2024-08-30
Payer: COMMERCIAL

## 2024-08-30 DIAGNOSIS — L81.4 LENTIGO: ICD-10-CM

## 2024-08-30 DIAGNOSIS — L82.1 SEBORRHEIC KERATOSIS: ICD-10-CM

## 2024-08-30 DIAGNOSIS — Z80.8 FAMILY HISTORY OF MELANOMA: ICD-10-CM

## 2024-08-30 DIAGNOSIS — D22.9 NEVUS: Primary | ICD-10-CM

## 2024-08-30 DIAGNOSIS — Z87.898 HISTORY OF ATYPICAL NEVUS: ICD-10-CM

## 2024-08-30 DIAGNOSIS — D18.01 ANGIOMA OF SKIN: ICD-10-CM

## 2024-08-30 PROCEDURE — 99213 OFFICE O/P EST LOW 20 MIN: CPT | Performed by: PHYSICIAN ASSISTANT

## 2024-08-30 PROCEDURE — G2211 COMPLEX E/M VISIT ADD ON: HCPCS | Performed by: PHYSICIAN ASSISTANT

## 2024-08-30 NOTE — PROGRESS NOTES
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 Golfing more this summer.   On review of systems, there are no further skin complaints, patient is feeling otherwise well.   ROS of the following were done and are negative: Constitutional, Eyes, Ears, Nose,   Mouth, Throat, Cardiovascular, Respiratory, GI, Genitourinary, Musculoskeletal,   Psychiatric, Endocrine, Allergic/Immunologic.    PHYSICAL EXAM:   There were no vitals taken for this visit.  Skin exam performed as follows: Type 2 skin. Mood appropriate  Alert and Oriented X 3. Well developed, well nourished in no distress.  General appearance: Normal  Head including face: Normal  Eyes: conjunctiva and lids: Normal  Mouth: Lips, teeth, gums: Normal  Neck: Normal  Chest-breast/axillae: Normal  Back: Normal  Extremities: digits/nails (clubbing): Normal  Eccrine and Apocrine glands: Normal  Right upper extremity: Normal  Left upper extremity: Normal  Right lower extremity: Normal  Left lower extremity: Normal  Skin: Scalp and body hair: See below    Pt deferred exam of breasts, groin, buttocks: No    Other physical findings:  1. Multiple pigmented macules on extremities and trunk  2. Multiple pigmented macules on face, trunk and extremities  3. Multiple vascular papules on trunk, arms and legs  4. Multiple scattered keratotic plaques         Except as noted  above, no other signs of skin cancer or melanoma.     ASSESSMENT/PLAN:   Benign Full skin cancer screening today. . Patient with history of a severely atypical nevus; fhx of melanoma  Advised on monthly self exams and 1 year  Patient Education: Appropriate brochures given.    Multiple benign appearing melanocytic nevi on arms, legs and trunk. Discussed ABCDEs of melanoma and sunscreen.   Multiple lentigos on arms, legs and trunk. Advised benign, no treatment needed.  Multiple scattered angiomas. Advised benign, no treatment needed.   Seborrheic keratosis on arms, legs and trunk. Advised benign, no treatment needed.              Follow-up: yearly/PRN sooner    1.) Patient was asked about new and changing moles. YES  2.) Patient received a complete physical skin examination: YES  3.) Patient was counseled to perform a monthly self skin examination: YES  Scribed By: Monika Hui MS, PA-C

## 2024-08-30 NOTE — LETTER
8/30/2024      Krystian Akins  [address]  [place] [address]      Dear Colleague,    Thank you for referring your patient, Krystian Akins, to the [facility]. Please see a copy of my visit note below.    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 Golfing more this summer.   On review of systems, there are no further skin complaints, patient is feeling otherwise well.   ROS of the following were done and are negative: Constitutional, Eyes, Ears, Nose,   Mouth, Throat, Cardiovascular, Respiratory, GI, Genitourinary, Musculoskeletal,   Psychiatric, Endocrine, Allergic/Immunologic.    PHYSICAL EXAM:   There were no vitals taken for this visit.  Skin exam performed as follows: Type 2 skin. Mood appropriate  Alert and Oriented X 3. Well developed, well nourished in no distress.  General appearance: Normal  Head including face: Normal  Eyes: conjunctiva and lids: Normal  Mouth: Lips, teeth, gums: Normal  Neck: Normal  Chest-breast/axillae: Normal  Back: Normal  Extremities: digits/nails (clubbing): Normal  Eccrine and Apocrine glands: Normal  Right upper extremity: Normal  Left upper extremity: Normal  Right lower extremity: Normal  Left lower extremity: Normal  Skin: Scalp and body hair: See below    Pt deferred exam of breasts, groin, buttocks: No    Other  physical findings:  1. Multiple pigmented macules on extremities and trunk  2. Multiple pigmented macules on face, trunk and extremities  3. Multiple vascular papules on trunk, arms and legs  4. Multiple scattered keratotic plaques         Except as noted above, no other signs of skin cancer or melanoma.     ASSESSMENT/PLAN:   Benign Full skin cancer screening today. . Patient with history of a severely atypical nevus; fhx of melanoma  Advised on monthly self exams and 1 year  Patient Education: Appropriate brochures given.    Multiple benign appearing melanocytic nevi on arms, legs and trunk. Discussed ABCDEs of melanoma and sunscreen.   Multiple lentigos on arms, legs and trunk. Advised benign, no treatment needed.  Multiple scattered angiomas. Advised benign, no treatment needed.   Seborrheic keratosis on arms, legs and trunk. Advised benign, no treatment needed.              Follow-up: yearly/PRN sooner    1.) Patient was asked about new and changing moles. YES  2.) Patient received a complete physical skin examination: YES  3.) Patient was counseled to perform a monthly self skin examination: YES  Scribed By: Monika Hui, MS, PA-C      Again, thank you for allowing me to participate in the care of your patient.        Sincerely,        Monika Hui PA-C

## 2024-09-24 ENCOUNTER — TELEPHONE (OUTPATIENT)
Dept: NEUROLOGY | Facility: CLINIC | Age: 64
End: 2024-09-24

## 2024-09-24 ENCOUNTER — OFFICE VISIT (OUTPATIENT)
Dept: NEUROLOGY | Facility: CLINIC | Age: 64
End: 2024-09-24
Attending: STUDENT IN AN ORGANIZED HEALTH CARE EDUCATION/TRAINING PROGRAM
Payer: COMMERCIAL

## 2024-09-24 DIAGNOSIS — R20.8 DYSESTHESIA: ICD-10-CM

## 2024-09-24 DIAGNOSIS — M54.17 LUMBOSACRAL RADICULOPATHY AT L5: Primary | ICD-10-CM

## 2024-09-24 PROCEDURE — 95910 NRV CNDJ TEST 7-8 STUDIES: CPT | Performed by: PSYCHIATRY & NEUROLOGY

## 2024-09-24 PROCEDURE — 95885 MUSC TST DONE W/NERV TST LIM: CPT | Mod: 59 | Performed by: PSYCHIATRY & NEUROLOGY

## 2024-09-24 PROCEDURE — 95886 MUSC TEST DONE W/N TEST COMP: CPT | Performed by: PSYCHIATRY & NEUROLOGY

## 2024-09-24 NOTE — PROGRESS NOTES
[facility]  Electrodiagnostic Laboratory                 Department of Neurology                                                                                                         Test Date:  2024    Patient: Krystian Akins : 1960 Physician: Josh Moore MD   Sex: Male AGE: 64 year Ref Phys: Facundo Prince MD   ID#: [id number]   Technician: Angeline Vasquez     History and Examination:    64-year-old man with chief complaint of burning pain in the feet, allodynia, and possible erythromelalgia.  He also has chronic low back pain.  EMG was requested to evaluate for large fiber polyneuropathy, versus lumbosacral radiculopathies.    Techniques:    Motor and sensory nerve conduction studies were done with surface recording electrodes. Temperature was monitored and recorded throughout the study. Upper extremities were maintained at a temperature of 32 degrees Centigrade or higher.  EMG was done with a concentric needle electrode.     Results:    Bilateral fibular (EDB), and tibial (AH) motor nerve conduction studies were normal.  The left fibular and tibial F-wave latencies were normal.  Bilateral superficial fibular and sural antidromic sensory nerve conduction studies were normal.    Needle EMG of bilateral tibialis anterior and tibialis posterior muscles showed no abnormal spontaneous activity (save for a brief high frequency of discharge of uncertain classification at the left TA).  There was mildly reduced recruitment of large, long-duration MUPs in all the above muscles.  Large, possibly long-duration MUPs with normal recruitment patterns were also appreciated at bilateral medial gastrocnemius muscles, without abnormal spontaneous activity.  Needle EMG examination of the following muscles was normal: Right vastus lateralis, gluteus medius, short head of biceps femoris, and left vastus medialis.    Interpretation:    Abnormal study.  There is  electrodiagnostic evidence of chronic inactive bilateral L5 (and possible very mild S1) radiculopathies.  There is no electrodiagnostic evidence of large fiber polyneuropathy.  That said, many  patients with burning pain in the feet and suspected erythromelalgia have small fiber neuropathy, which cannot be detected by nerve conduction studies.  Clinical correlation is recommended.    ___________________________  Josh Moore MD        Nerve Conduction Studies  Motor Sites      Latency Neg. Amp Neg. Amp Diff Segment Distance Velocity Neg. Dur Neg Area Diff Temperature Comment   Site (ms) Norm (mV) Norm (%)  cm m/s Norm (ms) (%) ( C)    Left Fibular (EDB) Motor   Ankle 4.6  < 6.0 3.4 -  Ankle-EDB 8   6.3  30    Bel Fibular Head 13.0 - 3.2 - -6 Bel Fibular Head-Ankle 33.1 39  > 38 6.4 1 30    Pop Fossa 15.7 - 3.2 - 0 Pop Fossa-Bel Fibular Head 10.5 39  > 38 6.5 -1 29.9    Right Fibular (EDB) Motor   Ankle 5.8  < 6.0 2.3 -  Ankle-EDB 8   5.6  29.8    Left Tibial (AHB) Motor   Ankle 5.6  < 6.5 6.2  > 5.0  Ankle-AH 8   4.5  29.8    Knee 15.8 - 4.7 - -24 Knee-Ankle 38.9 38  > 38 6.6 0 29.7    Right Tibial (AHB) Motor   Ankle 5.2  < 6.5 10.5  > 5.0  Ankle-AH 7.5   4.5  29.3      F-Wave Sites      Min F-Lat Max-Min F-Lat Mean F-Lat   Site (ms) (ms) (ms)   Left Fibular F-Wave   Ankle 60.0 1.80 -   Left Tibial F-Wave   Ankle 49.0 33.6 -     Sensory Sites      Onset Lat Peak Lat Amp (O-P) Amp (P-P) Segment Distance Velocity Temperature Comment   Site ms (ms)  V Norm ( V)  cm m/s Norm ( C)    Left Superficial Fibular Sensory   Lower Leg-Ankle 3.5 4.4 8  > 3 6 Lower Leg-Ankle - - - 29.8    Right Superficial Fibular Sensory   Lower Leg-Ankle 3.3 4.4 5  > 3 5 Lower Leg-Ankle 14 42 - 29.4    Left Sural Sensory   Calf-Lat Malleolus 3.5 4.3 11  > 5 14 Calf-Lat Malleolus 14 40  > 38 29.6    Right Sural Sensory   Calf-Lat Malleolus 3.4 4.3 8  > 5 12 Calf-Lat Malleolus 14 41  > 38 29.4        Electromyography     Side Muscle  Ins Act Fibs/PSW Fasc HF Amp Dur Poly Recrt Int Pat   Right Tib ant Nml None Nml 0 2+ 1+ 0 Saba Nml   Right Gastroc MH Nml None Nml 0 1+ Nml 0 Nml Nml   Right Vastus lat Nml None Nml 0 Nml Nml 0 Nml Nml   Right Gluteus med Nml None Nml 0 Nml Nml 0 Nml Nml   Right Biceps fem SH Nml None Nml 0 Nml Nml 0 Nml Nml   Right Tib post Nml None Nml 0 1+ 1+ 0 Saba Nml   Left Tib ant Nml None Nml 0 2+ 2+ 0 Saba Nml   Left Tib post Nml None Nml 0 2+ 2+ 0 Saba Nml   Left Gastroc MH Nml None Nml 0 2+ 1+ 0 Nml Nml   Left Vastus Med Nml None Nml 0 Nml Nml 0 Nml Nml         NCS Waveforms:    Motor                Sensory                F-Wave           Ultrasound Images:

## 2024-09-24 NOTE — TELEPHONE ENCOUNTER
Left Voicemail (1st Attempt) and Sent Mychart (1st Attempt) for the patient to call back and schedule the following:    Appointment type: Return Neurology  Provider: Niki  Return date: next available  Specialty phone number: [phone]  Additional appointment(s) needed: NA  Additonal Notes: discuss test results. Can be in person or virutal. If pt prefers virtual will need to schedule as in person and then switch to a virtual visit as virtual does not show up on Toni template    Susanne Mark on 9/24/2024 at 2:22 PM    .

## 2024-09-24 NOTE — LETTER
2024      Krystian Akins  [address]  [place] [address]      Dear Colleague,    Thank you for referring your patient, Krystian Akins, to the [facility] NEUROLOGY CLINICS [facility]. Please see a copy of my visit note below.                        [facility]  Electrodiagnostic Laboratory                 Department of Neurology                                                                                                         Test Date:  2024    Patient: Krystian Akins : 1960 Physician: Josh Moore MD   Sex: Male AGE: 64 year Ref Phys: Facundo Prince MD   ID#: [id number]   Technician: Angeline Vasquez     History and Examination:    64-year-old man with chief complaint of burning pain in the feet, allodynia, and possible erythromelalgia.  He also has chronic low back pain.  EMG was requested to evaluate for large fiber polyneuropathy, versus lumbosacral radiculopathies.    Techniques:    Motor and sensory nerve conduction studies were done with surface recording electrodes. Temperature was monitored and recorded throughout the study. Upper extremities were maintained at a temperature of 32 degrees Centigrade or higher.  EMG was done with a concentric needle electrode.     Results:    Bilateral fibular (EDB), and tibial (AH) motor nerve conduction studies were normal.  The left fibular and tibial F-wave latencies were normal.  Bilateral superficial fibular and sural antidromic sensory nerve conduction studies were normal.    Needle EMG of bilateral tibialis anterior and tibialis posterior muscles showed no abnormal spontaneous activity (save for a brief high frequency of discharge of uncertain classification at the left TA).  There was mildly reduced recruitment of large, long-duration MUPs in all the above muscles.  Large, possibly long-duration MUPs with normal recruitment patterns were also appreciated at bilateral medial gastrocnemius muscles,  without abnormal spontaneous activity.  Needle EMG examination of the following muscles was normal: Right vastus lateralis, gluteus medius, short head of biceps femoris, and left vastus medialis.    Interpretation:    Abnormal study.  There is electrodiagnostic evidence of chronic inactive bilateral L5 (and possible very mild S1) radiculopathies.  There is no electrodiagnostic evidence of large fiber polyneuropathy.  That said, many  patients with burning pain in the feet and suspected erythromelalgia have small fiber neuropathy, which cannot be detected by nerve conduction studies.  Clinical correlation is recommended.    ___________________________  Josh Moore MD        Nerve Conduction Studies  Motor Sites      Latency Neg. Amp Neg. Amp Diff Segment Distance Velocity Neg. Dur Neg Area Diff Temperature Comment   Site (ms) Norm (mV) Norm (%)  cm m/s Norm (ms) (%) ( C)    Left Fibular (EDB) Motor   Ankle 4.6  < 6.0 3.4 -  Ankle-EDB 8   6.3  30    Bel Fibular Head 13.0 - 3.2 - -6 Bel Fibular Head-Ankle 33.1 39  > 38 6.4 1 30    Pop Fossa 15.7 - 3.2 - 0 Pop Fossa-Bel Fibular Head 10.5 39  > 38 6.5 -1 29.9    Right Fibular (EDB) Motor   Ankle 5.8  < 6.0 2.3 -  Ankle-EDB 8   5.6  29.8    Left Tibial (AHB) Motor   Ankle 5.6  < 6.5 6.2  > 5.0  Ankle-AH 8   4.5  29.8    Knee 15.8 - 4.7 - -24 Knee-Ankle 38.9 38  > 38 6.6 0 29.7    Right Tibial (AHB) Motor   Ankle 5.2  < 6.5 10.5  > 5.0  Ankle-AH 7.5   4.5  29.3      F-Wave Sites      Min F-Lat Max-Min F-Lat Mean F-Lat   Site (ms) (ms) (ms)   Left Fibular F-Wave   Ankle 60.0 1.80 -   Left Tibial F-Wave   Ankle 49.0 33.6 -     Sensory Sites      Onset Lat Peak Lat Amp (O-P) Amp (P-P) Segment Distance Velocity Temperature Comment   Site ms (ms)  V Norm ( V)  cm m/s Norm ( C)    Left Superficial Fibular Sensory   Lower Leg-Ankle 3.5 4.4 8  > 3 6 Lower Leg-Ankle - - - 29.8    Right Superficial Fibular Sensory   Lower Leg-Ankle 3.3 4.4 5  > 3 5 Lower Leg-Ankle 14 42  - 29.4    Left Sural Sensory   Calf-Lat Malleolus 3.5 4.3 11  > 5 14 Calf-Lat Malleolus 14 40  > 38 29.6    Right Sural Sensory   Calf-Lat Malleolus 3.4 4.3 8  > 5 12 Calf-Lat Malleolus 14 41  > 38 29.4        Electromyography     Side Muscle Ins Act Fibs/PSW Fasc HF Amp Dur Poly Recrt Int Pat   Right Tib ant Nml None Nml 0 2+ 1+ 0 Saba Nml   Right Gastroc MH Nml None Nml 0 1+ Nml 0 Nml Nml   Right Vastus lat Nml None Nml 0 Nml Nml 0 Nml Nml   Right Gluteus med Nml None Nml 0 Nml Nml 0 Nml Nml   Right Biceps fem SH Nml None Nml 0 Nml Nml 0 Nml Nml   Right Tib post Nml None Nml 0 1+ 1+ 0 Saba Nml   Left Tib ant Nml None Nml 0 2+ 2+ 0 Saba Nml   Left Tib post Nml None Nml 0 2+ 2+ 0 Saba Nml   Left Gastroc MH Nml None Nml 0 2+ 1+ 0 Nml Nml   Left Vastus Med Nml None Nml 0 Nml Nml 0 Nml Nml         NCS Waveforms:    Motor                Sensory                F-Wave           Ultrasound Images:            Again, thank you for allowing me to participate in the care of your patient.        Sincerely,        Josh Moore MD

## 2024-09-26 NOTE — TELEPHONE ENCOUNTER
Left Voicemail (2nd Attempt) for the patient to call back and schedule the following:      Appointment type: Return Neurology  Provider: Niki  Return date: next available  Specialty phone number: [phone]  Additional appointment(s) needed: NA  Additonal Notes: discuss test results. Can be in person or virutal. If pt prefers virtual will need to schedule as in person and then switch to a virtual visit as virtual does not show up on Toni template      2nd attempt made to schedule     Edilma Heller on 9/26/2024 at 8:41 AM

## 2024-10-02 ENCOUNTER — VIRTUAL VISIT (OUTPATIENT)
Dept: NEUROLOGY | Facility: CLINIC | Age: 64
End: 2024-10-02
Payer: COMMERCIAL

## 2024-10-02 VITALS — WEIGHT: 177 LBS | BODY MASS INDEX: 24.01 KG/M2

## 2024-10-02 DIAGNOSIS — I73.81 ERYTHROMELALGIA (H): Primary | ICD-10-CM

## 2024-10-02 DIAGNOSIS — M47.816 SPONDYLOSIS OF LUMBAR REGION WITHOUT MYELOPATHY OR RADICULOPATHY: ICD-10-CM

## 2024-10-02 DIAGNOSIS — M54.17 LUMBOSACRAL RADICULOPATHY AT L5: ICD-10-CM

## 2024-10-02 PROCEDURE — 99213 OFFICE O/P EST LOW 20 MIN: CPT | Mod: 95 | Performed by: STUDENT IN AN ORGANIZED HEALTH CARE EDUCATION/TRAINING PROGRAM

## 2024-10-02 ASSESSMENT — PAIN SCALES - GENERAL: PAINLEVEL: NO PAIN (0)

## 2024-10-02 NOTE — PROGRESS NOTES
CHIEF COMPLAINT / REASON FOR VISIT  Mr. Akins returned to discuss the results of the tests and evaluations.   Consult conducted via real-time audio/video technology by Facundo Prince M.D. in [facility], Neurology [facility] clinic to the patient at home.    EMG performed by Dr. Moore on 9/24/2024 did not show any evidence of large fiber peripheral neuropathy.  There was electrophysiologic evidence of chronic inactive bilateral L5 and possibly very mild S1 radiculopathies.    He has been trying to cool off his feet, which seems to help with the burning and hot sensation of the feet.  However, he continues to have aching pain in the feet that are triggered by prolonged walking, sitting, or standing. He has not tried any topical cream yet.  He continues to have lower back pain, which he is seeing chiropractor for.      The following portions of the patient's history were reviewed and updated as appropriate: allergies, current medications, family history, medical history, surgical history, social history, and problem list.     ASSESSMENT / PLAN  #1 Erythromelalgia  #2  Lumbar spondylosis with chronic bilateral L5-S1 radiculopathies    We discussed the result of EMG.  I suspect he has 2 conditions causing different symptoms in the feet.  One, he has a component of erythromelalgia causing warm sensation and red coloration in the feet.  For this, I recommend cooling techniques as well as topical treatment as discussed at last visit.    In addition, he also has chronic bilateral L5-S1 radiculopathies, which can contribute to sensory symptoms in the feet.  His most recent MRI from 2021 showed mild disc bulging at this level.  Given ongoing back pain, we will obtain another MRI of the lumbar spine to evaluate for progression.  A referral to physical therapy was also discussed.  However, patient has tried physical therapy in the past but had worsening of symptoms.  He felt his back symptoms are better treated  with a chiropractor.    I will follow-up on the MRI lumbar spine and discussed the results with him either through [email or web address] messages or video appointment.    I spent a total of 20 minutes on the day of the visit for chart review, video visit, counseling/coordination of care, and documentation. Please see the note for further information on patient assessment and treatment.       PATIENT EDUCATION  Ready to learn, no apparent learning barriers were identified; learning preferences include listening.  Explained diagnosis and treatment plan; patient expressed understanding of the content.

## 2024-10-02 NOTE — LETTER
10/2/2024       RE: Krystian Akins  [address]  [place] [address]     Dear Colleague,    Thank you for referring your patient, Krystian Akins, to the [facility] NEUROLOGY CLINIC [place] [facility]. Please see a copy of my visit note below.    CHIEF COMPLAINT / REASON FOR VISIT  Mr. Akins returned to discuss the results of the tests and evaluations.   Consult conducted via real-time audio/video technology by Facundo Prince M.D. in [facility], Neurology [facility] to the patient at home.    EMG performed by Dr. Moore on 9/24/2024 did not show any evidence of large fiber peripheral neuropathy.  There was electrophysiologic evidence of chronic inactive bilateral L5 and possibly very mild S1 radiculopathies.    He has been trying to cool off his feet, which seems to help with the burning and hot sensation of the feet.  However, he continues to have aching pain in the feet that are triggered by prolonged walking, sitting, or standing. He has not tried any topical cream yet.  He continues to have lower back pain, which he is seeing chiropractor for.      The following portions of the patient's history were reviewed and updated as appropriate: allergies, current medications, family history, medical history, surgical history, social history, and problem list.     ASSESSMENT / PLAN  #1 Erythromelalgia  #2  Lumbar spondylosis with chronic bilateral L5-S1 radiculopathies    We discussed the result of EMG.  I suspect he has 2 conditions causing different symptoms in the feet.  One, he has a component of erythromelalgia causing warm sensation and red coloration in the feet.  For this, I recommend cooling techniques as well as topical treatment as discussed at last visit.    In addition, he also has chronic bilateral L5-S1 radiculopathies, which can contribute to sensory symptoms in the feet.  His most recent MRI from 2021 showed mild  disc bulging at this level.  Given ongoing back pain, we will obtain another MRI of the lumbar spine to evaluate for progression.  A referral to physical therapy was also discussed.  However, patient has tried physical therapy in the past but had worsening of symptoms.  He felt his back symptoms are better treated with a chiropractor.    I will follow-up on the MRI lumbar spine and discussed the results with him either through [employer] messages or video appointment.    I spent a total of 20 minutes on the day of the visit for chart review, video visit, counseling/coordination of care, and documentation. Please see the note for further information on patient assessment and treatment.       PATIENT EDUCATION  Ready to learn, no apparent learning barriers were identified; learning preferences include listening.  Explained diagnosis and treatment plan; patient expressed understanding of the content.        Again, thank you for allowing me to participate in the care of your patient.      Sincerely,    Facundo Prince MD

## 2024-10-02 NOTE — NURSING NOTE
Current patient location: [address] [place] [address] RD  [place] [address]    Is the patient currently in the state of [place]? YES    Visit mode:VIDEO    If the visit is dropped, the patient can be reconnected by: VIDEO VISIT: Text to cell phone:   Telephone Information:   Mobile [phone]       Will anyone else be joining the visit? NO  (If patient encounters technical issues they should call [phone]    How would you like to obtain your AVS? MyChart    Are changes needed to the allergy or medication list? No    Are refills needed on medications prescribed by this physician? NO    Reason for visit: RECHECK    Caitlin PÉREZ

## 2024-12-21 ENCOUNTER — HOSPITAL ENCOUNTER (OUTPATIENT)
Dept: MRI IMAGING | Facility: CLINIC | Age: 64
Discharge: HOME | End: 2024-12-21
Attending: STUDENT IN AN ORGANIZED HEALTH CARE EDUCATION/TRAINING PROGRAM | Admitting: STUDENT IN AN ORGANIZED HEALTH CARE EDUCATION/TRAINING PROGRAM
Payer: COMMERCIAL

## 2024-12-21 DIAGNOSIS — I73.81 ERYTHROMELALGIA (H): ICD-10-CM

## 2024-12-21 DIAGNOSIS — M54.17 LUMBOSACRAL RADICULOPATHY AT L5: ICD-10-CM

## 2024-12-21 DIAGNOSIS — M47.816 SPONDYLOSIS OF LUMBAR REGION WITHOUT MYELOPATHY OR RADICULOPATHY: ICD-10-CM

## 2024-12-21 PROCEDURE — 255N000002 HC RX 255 OP 636: Performed by: STUDENT IN AN ORGANIZED HEALTH CARE EDUCATION/TRAINING PROGRAM

## 2024-12-21 PROCEDURE — A9585 GADOBUTROL INJECTION: HCPCS | Performed by: STUDENT IN AN ORGANIZED HEALTH CARE EDUCATION/TRAINING PROGRAM

## 2024-12-21 PROCEDURE — 72158 MRI LUMBAR SPINE W/O & W/DYE: CPT

## 2024-12-21 RX ORDER — GADOBUTROL 604.72 MG/ML
0.1 INJECTION INTRAVENOUS ONCE
Status: COMPLETED | OUTPATIENT
Start: 2024-12-21 | End: 2024-12-21

## 2024-12-21 RX ADMIN — GADOBUTROL 8.03 ML: 604.72 INJECTION INTRAVENOUS at 11:29

## 2025-02-19 ENCOUNTER — DOCUMENTATION ONLY (OUTPATIENT)
Dept: INTERNAL MEDICINE | Facility: CLINIC | Age: 65
End: 2025-02-19
Payer: COMMERCIAL

## 2025-02-19 NOTE — PROGRESS NOTES
Good day-  The laboratory policy limits us from releasing orders that are more than 2 weeks out from the patient's lab appointment on. Could you please update expected date for these tests so that we can release them for your patient?      Krystian Akins    : 1960    MRN: [id number]    Test:   Prostate Specific Antigen Screen   Expected Expires      3/29/2025 (Approximate) 2025        Comprehensive metabolic panel   Expected Expires      3/29/2025 (Approximate) 2025        CBC with platelets   Expected Expires      3/29/2025 (Approximate) 2025        Lipid panel reflex to direct LDL Fasting   Expected Expires      3/29/2025 (Approximate) 2025        Hemoglobin A1c   Expected Expires      3/29/2025 (Approximate) 2025        Thank you,     If no labs are needed at this time please have the Care Team contact patient regarding this information and cancel lab appointment. Thank you.     Ann MANTILLA

## 2025-02-24 ENCOUNTER — LAB (OUTPATIENT)
Dept: LAB | Facility: CLINIC | Age: 65
End: 2025-02-24
Payer: COMMERCIAL

## 2025-02-24 DIAGNOSIS — E78.5 HYPERLIPIDEMIA LDL GOAL <100: ICD-10-CM

## 2025-02-24 DIAGNOSIS — Z12.5 SCREENING FOR PROSTATE CANCER: ICD-10-CM

## 2025-02-24 DIAGNOSIS — R73.9 BLOOD GLUCOSE ELEVATED: ICD-10-CM

## 2025-02-24 DIAGNOSIS — Z00.00 ROUTINE GENERAL MEDICAL EXAMINATION AT A HEALTH CARE FACILITY: ICD-10-CM

## 2025-02-24 LAB
ALBUMIN SERPL BCG-MCNC: 5 G/DL (ref 3.5–5.2)
ALP SERPL-CCNC: 76 U/L (ref 40–150)
ALT SERPL W P-5'-P-CCNC: 37 U/L (ref 0–70)
ANION GAP SERPL CALCULATED.3IONS-SCNC: 15 MMOL/L (ref 7–15)
AST SERPL W P-5'-P-CCNC: 30 U/L (ref 0–45)
BILIRUB SERPL-MCNC: 0.9 MG/DL
BUN SERPL-MCNC: 16.9 MG/DL (ref 8–23)
CALCIUM SERPL-MCNC: 9.8 MG/DL (ref 8.8–10.4)
CHLORIDE SERPL-SCNC: 103 MMOL/L (ref 98–107)
CHOLEST SERPL-MCNC: 183 MG/DL
CREAT SERPL-MCNC: 1.01 MG/DL (ref 0.67–1.17)
EGFRCR SERPLBLD CKD-EPI 2021: 84 ML/MIN/1.73M2
ERYTHROCYTE [DISTWIDTH] IN BLOOD BY AUTOMATED COUNT: 11.4 % (ref 10–15)
EST. AVERAGE GLUCOSE BLD GHB EST-MCNC: 114 MG/DL
FASTING STATUS PATIENT QL REPORTED: YES
FASTING STATUS PATIENT QL REPORTED: YES
GLUCOSE SERPL-MCNC: 103 MG/DL (ref 70–99)
HBA1C MFR BLD: 5.6 % (ref 0–5.6)
HCO3 SERPL-SCNC: 23 MMOL/L (ref 22–29)
HCT VFR BLD AUTO: 46.7 % (ref 40–53)
HDLC SERPL-MCNC: 51 MG/DL
HGB BLD-MCNC: 16.4 G/DL (ref 13.3–17.7)
LDLC SERPL CALC-MCNC: 99 MG/DL
MCH RBC QN AUTO: 31.1 PG (ref 26.5–33)
MCHC RBC AUTO-ENTMCNC: 35.1 G/DL (ref 31.5–36.5)
MCV RBC AUTO: 88 FL (ref 78–100)
NONHDLC SERPL-MCNC: 132 MG/DL
PLATELET # BLD AUTO: 181 10E3/UL (ref 150–450)
POTASSIUM SERPL-SCNC: 4.4 MMOL/L (ref 3.4–5.3)
PROT SERPL-MCNC: 7.7 G/DL (ref 6.4–8.3)
PSA SERPL DL<=0.01 NG/ML-MCNC: 2.52 NG/ML (ref 0–4.5)
RBC # BLD AUTO: 5.28 10E6/UL (ref 4.4–5.9)
SODIUM SERPL-SCNC: 141 MMOL/L (ref 135–145)
TRIGL SERPL-MCNC: 167 MG/DL
WBC # BLD AUTO: 5.8 10E3/UL (ref 4–11)

## 2025-02-24 PROCEDURE — G0103 PSA SCREENING: HCPCS

## 2025-02-24 PROCEDURE — 85027 COMPLETE CBC AUTOMATED: CPT

## 2025-02-24 PROCEDURE — 80061 LIPID PANEL: CPT

## 2025-02-24 PROCEDURE — 83036 HEMOGLOBIN GLYCOSYLATED A1C: CPT

## 2025-02-24 PROCEDURE — 80053 COMPREHEN METABOLIC PANEL: CPT

## 2025-02-24 PROCEDURE — 36415 COLL VENOUS BLD VENIPUNCTURE: CPT

## 2025-03-10 ENCOUNTER — OFFICE VISIT (OUTPATIENT)
Dept: INTERNAL MEDICINE | Facility: CLINIC | Age: 65
End: 2025-03-10
Payer: COMMERCIAL

## 2025-03-10 VITALS
DIASTOLIC BLOOD PRESSURE: 82 MMHG | TEMPERATURE: 97.2 F | HEIGHT: 72 IN | SYSTOLIC BLOOD PRESSURE: 136 MMHG | HEART RATE: 75 BPM | WEIGHT: 182.8 LBS | OXYGEN SATURATION: 97 % | BODY MASS INDEX: 24.76 KG/M2 | RESPIRATION RATE: 14 BRPM

## 2025-03-10 DIAGNOSIS — M79.672 LEFT FOOT PAIN: ICD-10-CM

## 2025-03-10 DIAGNOSIS — E78.5 HYPERLIPIDEMIA LDL GOAL <100: ICD-10-CM

## 2025-03-10 DIAGNOSIS — M54.16 LUMBAR RADICULOPATHY: ICD-10-CM

## 2025-03-10 DIAGNOSIS — R73.9 BLOOD GLUCOSE ELEVATED: ICD-10-CM

## 2025-03-10 DIAGNOSIS — F41.9 ANXIETY: ICD-10-CM

## 2025-03-10 DIAGNOSIS — Z00.01 ENCOUNTER FOR ROUTINE ADULT MEDICAL EXAM WITH ABNORMAL FINDINGS: Primary | ICD-10-CM

## 2025-03-10 DIAGNOSIS — A63.0 GENITAL WARTS: ICD-10-CM

## 2025-03-10 PROBLEM — M77.12 LATERAL EPICONDYLITIS OF LEFT ELBOW: Status: RESOLVED | Noted: 2021-08-28 | Resolved: 2025-03-10

## 2025-03-10 PROCEDURE — 99214 OFFICE O/P EST MOD 30 MIN: CPT | Mod: 25 | Performed by: INTERNAL MEDICINE

## 2025-03-10 PROCEDURE — 3075F SYST BP GE 130 - 139MM HG: CPT | Performed by: INTERNAL MEDICINE

## 2025-03-10 PROCEDURE — 3079F DIAST BP 80-89 MM HG: CPT | Performed by: INTERNAL MEDICINE

## 2025-03-10 PROCEDURE — 99396 PREV VISIT EST AGE 40-64: CPT | Performed by: INTERNAL MEDICINE

## 2025-03-10 RX ORDER — PROPRANOLOL HYDROCHLORIDE 10 MG/1
10 TABLET ORAL 3 TIMES DAILY PRN
Qty: 60 TABLET | Refills: 2 | Status: SHIPPED | OUTPATIENT
Start: 2025-03-10

## 2025-03-10 RX ORDER — ATORVASTATIN CALCIUM 40 MG/1
40 TABLET, FILM COATED ORAL DAILY
Qty: 90 TABLET | Refills: 3 | Status: SHIPPED | OUTPATIENT
Start: 2025-03-10

## 2025-03-10 SDOH — HEALTH STABILITY: PHYSICAL HEALTH: ON AVERAGE, HOW MANY MINUTES DO YOU ENGAGE IN EXERCISE AT THIS LEVEL?: 30 MIN

## 2025-03-10 SDOH — HEALTH STABILITY: PHYSICAL HEALTH: ON AVERAGE, HOW MANY DAYS PER WEEK DO YOU ENGAGE IN MODERATE TO STRENUOUS EXERCISE (LIKE A BRISK WALK)?: 4 DAYS

## 2025-03-10 ASSESSMENT — SOCIAL DETERMINANTS OF HEALTH (SDOH): HOW OFTEN DO YOU GET TOGETHER WITH FRIENDS OR RELATIVES?: MORE THAN THREE TIMES A WEEK

## 2025-03-10 NOTE — PROGRESS NOTES
Preventive Care Visit  [facility]  Richard Purcell MD, Internal Medicine  Mar 10, 2025       ASSESSMENT:    1. Encounter for routine adult medical exam with abnormal findings (Primary)  Patient declines COVID or influenza vaccination.  Will get Tdap through pharmacy.  Discussed option of Shingrix.  Colonoscopy due again in 2031.  Eye exam completed February 2025    2. Hyperlipidemia LDL goal <100  Controlled except for minimal triglyceride.  Continue statin therapy.  Repeat lipids 1 year  - atorvastatin (LIPITOR) 40 MG tablet; Take 1 tablet (40 mg) by mouth daily.  Dispense: 90 tablet; Refill: 3    3. Anxiety  Used only with performance anxiety situations.  Currently asymptomatic.  Medicine working well for patient  - propranolol (INDERAL) 10 MG tablet; Take 1 tablet (10 mg) by mouth 3 times daily as needed (anxiety).  Dispense: 60 tablet; Refill: 2    4. Lumbar radiculopathy  Denies back pain but burning feeling in left foot Will occur sometimes when lying flat.  Discussed stretching exercises for low back.  Patient will try using an orthotic.  If symptoms not improved, will consider physical therapy referral vs trial Gabapentin    5. Left foot pain   See #4. ? Separate issue in foot such as Reyes's neuroma vs radicular issue from back. Trial of orthotic. If not improved, will have pt see podiatry for second opinion. Defer Xray without hx trauma    6. Blood glucose elevated  Minimal glucose elevation.  Normal A1c.  Counseled regarding carbohydrate reduction in diet.  Continue exercise     7. Genital warts   Single lesion on penis shaft. Treated with LN x4      PLAN:  Continue current medications  Prescriptions refilled at your pharmacy.    Call  [phone] or use [employer] to schedule a future lab appointment  fasting in 1 year.   For fasting labs, please refrain from eating for 8 hours or more.   Drink 2 glasses of water before your lab appointment. It is fine to take your  oral medications on  the morning of the lab test as usual  Reduce calorie/carbohydrate (sugar, bread, potato, pasta, rice, alcohol etc)  intake in diet.  Increase color on your plate with vegetables.Continue frequency of walking or other aerobic exercise as able   I would recommend  a  TDAP  vaccine (tetanus, whooping cough risk reduction). Get at a pharmacy  Penile lesion treated  May  consider Shingrix  vaccination (2 shot series) for shingles protection. If wait until Medicare, then get at a pharmacy   Healthcare  Directive discussed and  will bring copy to the clinic     Trial orthotic for feet. If foot pain  issues persists and wish to see podiatry, then send me a note in  [facility] and referral will be ordered and/or physical therapy  Pt was informed regarding extra E&M billing for management of new or established medical issues not related to today's wellness/screening visit           Tami Boland is a 64 year old, presenting for the following:  Physical  And follow-up regarding medical issues as above       HPI     Advance Care Planning  Patient does not have a Health Care Directive: Discussed advance care planning with patient; information given to patient to review.        3/10/2025   General Health   How would you rate your overall physical health? Good   Feel stress (tense, anxious, or unable to sleep) Only a little   (!) STRESS CONCERN      3/10/2025   Nutrition   Three or more servings of calcium each day? Yes   Diet: Regular (no restrictions)   How many servings of fruit and vegetables per day? (!) 2-3   How many sweetened beverages each day? 0-1         3/10/2025   Exercise   Days per week of moderate/strenous exercise 4 days   Average minutes spent exercising at this level 30 min         3/10/2025   Social Factors   Frequency of gathering with friends or relatives More than three times a week   Worry food won't last until get money to buy more No   Food not last or not have enough money for food? No   Do you have  housing? (Housing is defined as stable permanent housing and does not include staying ouside in a car, in a tent, in an abandoned building, in an overnight shelter, or couch-surfing.) Yes   Are you worried about losing your housing? No   Lack of transportation? No   Unable to get utilities (heat,electricity)? No         3/10/2025   Fall Risk   Fallen 2 or more times in the past year? No   Trouble with walking or balance? No          3/10/2025   Dental   Dentist two times every year? Yes           4/26/2024   TB Screening   Were you born outside of the [place]? No           Today's PHQ-2 Score:       3/10/2025    11:47 AM   PHQ-2 ( 1999 [employer])   Q1: Little interest or pleasure in doing things 0   Q2: Feeling down, depressed or hopeless 0   PHQ-2 Score 0    Q1: Little interest or pleasure in doing things Not at all   Q2: Feeling down, depressed or hopeless Not at all   PHQ-2 Score 0       Patient-reported           3/10/2025   Substance Use   Alcohol more than 3/day or more than 7/wk No   Do you use any other substances recreationally? No     Social History     Tobacco Use    Smoking status: Never    Smokeless tobacco: Never   Vaping Use    Vaping status: Never Used   Substance Use Topics    Alcohol use: Yes     Comment: 2-3 drinks per month    Drug use: Never            3/10/2025   STI Screening   New sexual partner(s) since last STI/HIV test? No   Last PSA:   PSA   Date Value Ref Range Status   08/27/2019 1.96 0 - 4 ug/L Final     Comment:     Assay Method:  Chemiluminescence using Siemens Vista analyzer     Prostate Specific Antigen Screen   Date Value Ref Range Status   02/24/2025 2.52 0.00 - 4.50 ng/mL Final     ASCVD Risk   The 10-year ASCVD risk score (Ping SANDOVAL, et al., 2019) is: 14.3%    Values used to calculate the score:      Age: 64 years      Sex: Male      Is Non- : No      Diabetic: No      Tobacco smoker: No      Systolic Blood Pressure: 158 mmHg      Is BP treated: No       HDL Cholesterol: 51 mg/dL      Total Cholesterol: 183 mg/dL       Component      Latest Ref Rng 2/24/2025  11:11 AM   Sodium      135 - 145 mmol/L 141    Potassium      3.4 - 5.3 mmol/L 4.4    Carbon Dioxide (CO2)      22 - 29 mmol/L 23    Anion Gap      7 - 15 mmol/L 15    Urea Nitrogen      8.0 - 23.0 mg/dL 16.9    Creatinine      0.67 - 1.17 mg/dL 1.01    GFR Estimate      >60 mL/min/1.73m2 84    Calcium      8.8 - 10.4 mg/dL 9.8    Chloride      98 - 107 mmol/L 103    Glucose      70 - 99 mg/dL 103 (H)    Alkaline Phosphatase      40 - 150 U/L 76    AST      0 - 45 U/L 30    ALT      0 - 70 U/L 37    Protein Total      6.4 - 8.3 g/dL 7.7    Albumin      3.5 - 5.2 g/dL 5.0    Bilirubin Total      <=1.2 mg/dL 0.9    Patient Fasting? Yes    Patient Fasting? Yes    WBC      4.0 - 11.0 10e3/uL 5.8    RBC Count      4.40 - 5.90 10e6/uL 5.28    Hemoglobin      13.3 - 17.7 g/dL 16.4    Hematocrit      40.0 - 53.0 % 46.7    MCV      78 - 100 fL 88    MCH      26.5 - 33.0 pg 31.1    MCHC      31.5 - 36.5 g/dL 35.1    RDW      10.0 - 15.0 % 11.4    Platelet Count      150 - 450 10e3/uL 181    Cholesterol      <200 mg/dL 183    Triglycerides      <150 mg/dL 167 (H)    HDL Cholesterol      >=40 mg/dL 51    LDL Cholesterol Calculated      <100 mg/dL 99    Non HDL Cholesterol      <130 mg/dL 132 (H)    Estimated Average Glucose      <117 mg/dL 114    Hemoglobin A1C      0.0 - 5.6 % 5.6    Prostate Specific Antigen Screen      0.00 - 4.50 ng/mL 2.52       Legend:  (H) High      Pt's past medical history, family history, habits, medications and allergies were reviewed with the patient today.   Most recent lab results reviewed with pt. Problem list and histories reviewed & adjusted, as indicated.      Screening questionnaire responses above  regarding general health status, nutrition, exercise, social engagement, fall risk,   dental concerns,   mental health, substance use concerns were reviewed with the patient today.    Weight/BMI status reviewed.  Preventative Healthcare counseling provided to pt as applicable based on positive pt responses/concerns/results of above screening questionnaire.  See pt instructions for additional recommendations.   Otherwise reinforced continuation of good health care maintenance as above.  Also reviewed immunization guidelines,  eye screening for vision/glaucoma at least every other year, routine cancer screenings including annual skin cancer check with myself or dermatology, colon cancer screening for everyone until age 75 and then individualized to age 80, prostate cancer screening in men until age 75   Declines COVID and influenza vaccination.  Will get Tdap through pharmacy.  Discussed option of Shingrix.  Colonoscopy due in 2031.  Discussed diet and exercise.  Had eye exam February 2025            Review of Systems  CONSTITUTIONAL: NEGATIVE for fever, chills. Weight up 5 pounds  INTEGUMENTARY/SKIN: NEGATIVE for worrisome rashes, moles or lesions except for small penile genital wart. Hx similar wart in distant past per pt. Wants to have treated  EYES: NEGATIVE for vision changes or irritation  ENT/MOUTH: NEGATIVE for ear, mouth and throat problems  RESP: NEGATIVE for significant cough or SOB  CV: NEGATIVE for chest pain, palpitations or peripheral edema  GI: NEGATIVE for nausea, abdominal pain, heartburn, or change in bowel habits  : NEGATIVE for frequency, dysuria, or hematuria. Has recurrent genital wart on penis. No penisl disgarge  MUSCULOSKELETAL:  POSITIVE for pain left foot since last summer after walking  in [place].   Describes as an achy pain more consistently right in the ball of foot.   Sx mild. Rare numbness feeling in same area  NEURO: NEGATIVE for weakness, dizziness . Foot worse with lying  down 15 minutes re: numbness.  Denies back pain  ENDOCRINE: NEGATIVE for temperature intolerance. Lipids at goal with statin  HEME: NEGATIVE for bleeding problems  PSYCHIATRIC: NEGATIVE  for changes in mood or affect overall.  Rare use of propranolol for performance anxiety     Objective    Exam  /82   Pulse 75   Temp 97.2  F (36.2  C) (Temporal)   Resp 14   Ht 1.829 m (6')   Wt 82.9 kg (182 lb 12.8 oz)   SpO2 97%   BMI 24.79 kg/m     Estimated body mass index is 24.79 kg/m  as calculated from the following:    Height as of this encounter: 1.829 m (6').    Weight as of this encounter: 82.9 kg (182 lb 12.8 oz).    Physical Exam  General appearance - healthy, alert, no distress  Skin - No rashes or lesions.  Head - normocephalic, atraumatic  Eyes - PRIYANKA, EOMI, fundi exam with nondilated pupils negative.  Ears - External ears normal. Canals clear. TM's normal.  Nose/Sinuses - Nares normal. Septum midline. Mucosa normal. No drainage or sinus tenderness.  Oropharynx - No erythema, no adenopathy, no exudates.  Neck - Supple without adenopathy or thyromegaly. No bruits.  Lungs - Clear to auscultation without wheezes/rhonchi.  Heart - Regular rate and rhythm without murmurs, clicks, or gallops.  Nodes - No supraclavicular, axillary, or inguinal adenopathy palpable.  Abdomen - Abdomen soft, non-tender. BS normal. No masses or hepatosplenomegaly palpable. No bruits.  Extremities -No cyanosis, clubbing or edema.    Musculoskeletal - Spine ROM normal.  Paralumbar musculature nontender to palpation.  Negative straight leg raise test bilaterally.  Muscular strength intact.  Minimal tenderness to palpation near the distal third MT head  Peripheral pulses - radial=4/4, femoral=4/4, posterior tibial=4/4, dorsalis pedis=4/4,  Neuro - Gait normal. Reflexes normal and symmetric. Sensation grossly WNL.  Genital - Normal-appearing male external genitalia except for 2mm flat wart mid penile shaft. Following verbal consent, was treated with liquid nitrogen x4 spray. No complication.  No scrotal masses or inguinal hernia palpable.   Rectal - deferred          Signed Electronically by: Richard Purcell MD

## 2025-03-10 NOTE — PATIENT INSTRUCTIONS
Continue current medications  Prescriptions refilled at your pharmacy.    Call  [phone] or use [employer] to schedule a future lab appointment  fasting in 1 year.   For fasting labs, please refrain from eating for 8 hours or more.   Drink 2 glasses of water before your lab appointment. It is fine to take your  oral medications on the morning of the lab test as usual  Reduce calorie/carbohydrate (sugar, bread, potato, pasta, rice, alcohol etc)  intake in diet.  Increase color on your plate with vegetables.Continue frequency of walking or other aerobic exercise as able   I would recommend  a  TDAP  vaccine (tetanus, whooping cough risk reduction). Get at a pharmacy  Penile lesion treated  May  consider Shingrix  vaccination (2 shot series) for shingles protection. If wait until Medicare, then get at a pharmacy   Healthcare  Directive discussed and  will bring copy to the clinic     Trial orthotic for feet. If foot pain  issues persists and wish to see podiatry, then send ne a note in  [employer] and referral will be ordered  Pt was informed regarding extra E&M billing for management of new or established medical issues not related to today's wellness/screening visit

## 2025-03-13 PROBLEM — R73.9 BLOOD GLUCOSE ELEVATED: Status: ACTIVE | Noted: 2025-03-13

## 2025-03-13 PROBLEM — A63.0 GENITAL WARTS: Status: ACTIVE | Noted: 2025-03-13

## 2025-03-13 PROBLEM — M79.672 LEFT FOOT PAIN: Status: ACTIVE | Noted: 2025-03-13

## (undated) DEVICE — LINEN HALF SHEET 5512

## (undated) DEVICE — CATH URETERAL FLEX TIP TIGERTAIL 06FRX70CM 139006

## (undated) DEVICE — GUIDEWIRE ZIPWIRE STR STIFF .035"X150CM M006630222B0

## (undated) DEVICE — PACK CYSTO CUSTOM RIDGES

## (undated) DEVICE — BAG CLEAR TRASH 1.3M 39X33" P4040C

## (undated) DEVICE — GLOVE PROTEXIS POWDER FREE SMT 7.5  2D72PT75X

## (undated) DEVICE — RAD RX ISOVUE 300 (50ML) 61% IOPAMIDOL CHARGE PER ML

## (undated) DEVICE — SOL WATER IRRIG 1000ML BOTTLE 2F7114

## (undated) DEVICE — COVER FOOTSWITCH W/CINCH 20X24" 923267

## (undated) DEVICE — PREP TECHNI-CARE CHLOROXYLENOL 3% 4OZ BOTTLE C222-4ZWO

## (undated) DEVICE — Device

## (undated) DEVICE — SHEATH URETERAL ACCESS NAVIGATOR HD 12/14FRX46CM M0062502260

## (undated) DEVICE — WIRE GUIDE AMPLATZ SUPER STIFF 0.035"X145CM 46-524

## (undated) DEVICE — BASKET NITINOL TIPLESS HALO  1.5FRX120CM 554120

## (undated) DEVICE — SOL NACL 0.9% IRRIG 3000ML BAG 2B7477

## (undated) DEVICE — PAD CHUX UNDERPAD 23X24" 7136

## (undated) DEVICE — LINEN FULL SHEET 5511

## (undated) DEVICE — TUBING IRRIG TUR Y TYPE 96" LF 6543-01

## (undated) DEVICE — GUIDEWIRE SENSOR DUAL FLEX STR 0.035"X150CM M0066703080

## (undated) RX ORDER — DEXAMETHASONE SODIUM PHOSPHATE 4 MG/ML
INJECTION, SOLUTION INTRA-ARTICULAR; INTRALESIONAL; INTRAMUSCULAR; INTRAVENOUS; SOFT TISSUE
Status: DISPENSED
Start: 2019-05-16

## (undated) RX ORDER — GLYCOPYRROLATE 0.2 MG/ML
INJECTION INTRAMUSCULAR; INTRAVENOUS
Status: DISPENSED
Start: 2019-05-30

## (undated) RX ORDER — FENTANYL CITRATE 50 UG/ML
INJECTION, SOLUTION INTRAMUSCULAR; INTRAVENOUS
Status: DISPENSED
Start: 2019-05-30

## (undated) RX ORDER — PROPOFOL 10 MG/ML
INJECTION, EMULSION INTRAVENOUS
Status: DISPENSED
Start: 2019-05-30

## (undated) RX ORDER — FENTANYL CITRATE 50 UG/ML
INJECTION, SOLUTION INTRAMUSCULAR; INTRAVENOUS
Status: DISPENSED
Start: 2019-05-16

## (undated) RX ORDER — CEFAZOLIN SODIUM 2 G/100ML
INJECTION, SOLUTION INTRAVENOUS
Status: DISPENSED
Start: 2019-05-30

## (undated) RX ORDER — DEXAMETHASONE SODIUM PHOSPHATE 4 MG/ML
INJECTION, SOLUTION INTRA-ARTICULAR; INTRALESIONAL; INTRAMUSCULAR; INTRAVENOUS; SOFT TISSUE
Status: DISPENSED
Start: 2019-05-30

## (undated) RX ORDER — ONDANSETRON 2 MG/ML
INJECTION INTRAMUSCULAR; INTRAVENOUS
Status: DISPENSED
Start: 2019-05-30

## (undated) RX ORDER — CEFAZOLIN SODIUM 2 G/100ML
INJECTION, SOLUTION INTRAVENOUS
Status: DISPENSED
Start: 2019-05-16

## (undated) RX ORDER — OXYCODONE HYDROCHLORIDE 5 MG/1
TABLET ORAL
Status: DISPENSED
Start: 2019-05-30